# Patient Record
Sex: FEMALE | Race: WHITE | URBAN - METROPOLITAN AREA
[De-identification: names, ages, dates, MRNs, and addresses within clinical notes are randomized per-mention and may not be internally consistent; named-entity substitution may affect disease eponyms.]

---

## 2023-09-18 ENCOUNTER — TRANSCRIBE ORDERS (OUTPATIENT)
Dept: SLEEP CENTER | Age: 48
End: 2023-09-18

## 2023-09-18 DIAGNOSIS — G47.10 HYPERSOMNIA: Primary | ICD-10-CM

## 2023-09-18 DIAGNOSIS — G47.33 OBSTRUCTIVE SLEEP APNEA: ICD-10-CM

## 2023-11-13 ENCOUNTER — HOSPITAL ENCOUNTER (OUTPATIENT)
Dept: SLEEP CENTER | Age: 48
Discharge: HOME | End: 2023-11-13
Payer: MEDICARE

## 2023-11-13 DIAGNOSIS — G47.33 OBSTRUCTIVE SLEEP APNEA: ICD-10-CM

## 2023-11-13 DIAGNOSIS — G47.10 HYPERSOMNIA: ICD-10-CM

## 2023-11-13 PROCEDURE — G0398 HOME SLEEP TEST/TYPE 2 PORTA: HCPCS

## 2023-11-13 NOTE — PROGRESS NOTES
Dedra Vazquez  1975  arrived at [address] [place] [address] on 11/13/2023 for set up and instruction of home sleep study with the [facility] unit.  she was instructed on proper set-up and operation of HST unit. Written instructions with set up diagram given for reference and reinforcement of verbal instruction and demonstration. she was able to return demonstration appropriately. No home environment, vision, dexterity, comprehension concerns with this patient based on completed forms and patient interactions. Patient will return unit after 2 nights as instructed.     Electronically signed by Jarred Hoyt on 11/13/2023 at 4:04 PM

## 2024-06-24 ENCOUNTER — APPOINTMENT (OUTPATIENT)
Dept: CT IMAGING | Age: 49
End: 2024-06-24
Payer: MEDICARE

## 2024-06-24 ENCOUNTER — HOSPITAL ENCOUNTER (EMERGENCY)
Age: 49
Discharge: HOME | End: 2024-06-24
Attending: EMERGENCY MEDICINE
Payer: MEDICARE

## 2024-06-24 VITALS
WEIGHT: 230 LBS | HEIGHT: 62 IN | TEMPERATURE: 98.4 F | OXYGEN SATURATION: 93 % | BODY MASS INDEX: 42.33 KG/M2 | HEART RATE: 88 BPM | DIASTOLIC BLOOD PRESSURE: 83 MMHG | RESPIRATION RATE: 18 BRPM | SYSTOLIC BLOOD PRESSURE: 116 MMHG

## 2024-06-24 DIAGNOSIS — R10.9 FLANK PAIN: Primary | ICD-10-CM

## 2024-06-24 LAB
ALBUMIN SERPL-MCNC: 3.7 GM/DL (ref 3.4–5)
ALP BLD-CCNC: 141 IU/L (ref 40–129)
ALT SERPL-CCNC: 30 U/L (ref 10–40)
ANION GAP SERPL CALCULATED.3IONS-SCNC: 16 MMOL/L (ref 7–16)
AST SERPL-CCNC: 29 IU/L (ref 15–37)
BASOPHILS ABSOLUTE: 0.1 K/CU MM
BASOPHILS RELATIVE PERCENT: 0.5 % (ref 0–1)
BILIRUB SERPL-MCNC: 0.2 MG/DL (ref 0–1)
BILIRUBIN, URINE: NEGATIVE MG/DL
BLOOD, URINE: NEGATIVE
BUN SERPL-MCNC: 14 MG/DL (ref 6–23)
CALCIUM SERPL-MCNC: 10.6 MG/DL (ref 8.3–10.6)
CHLORIDE BLD-SCNC: 100 MMOL/L (ref 99–110)
CLARITY: CLEAR
CO2: 23 MMOL/L (ref 21–32)
COLOR: YELLOW
COMMENT UA: ABNORMAL
CREAT SERPL-MCNC: 0.9 MG/DL (ref 0.6–1.1)
DIFFERENTIAL TYPE: ABNORMAL
EOSINOPHILS ABSOLUTE: 0.2 K/CU MM
EOSINOPHILS RELATIVE PERCENT: 1.3 % (ref 0–3)
GFR, ESTIMATED: 80 ML/MIN/1.73M2
GLUCOSE SERPL-MCNC: 97 MG/DL (ref 70–99)
GLUCOSE URINE: >1000 MG/DL
HCT VFR BLD CALC: 40.6 % (ref 37–47)
HEMOGLOBIN: 12.7 GM/DL (ref 12.5–16)
IMMATURE NEUTROPHIL %: 0.5 % (ref 0–0.43)
KETONES, URINE: NEGATIVE MG/DL
LEUKOCYTE ESTERASE, URINE: NEGATIVE
LYMPHOCYTES ABSOLUTE: 4.9 K/CU MM
LYMPHOCYTES RELATIVE PERCENT: 27.3 % (ref 24–44)
MCH RBC QN AUTO: 29.3 PG (ref 27–31)
MCHC RBC AUTO-ENTMCNC: 31.3 % (ref 32–36)
MCV RBC AUTO: 93.5 FL (ref 78–100)
MONOCYTES ABSOLUTE: 1.1 K/CU MM
MONOCYTES RELATIVE PERCENT: 6.1 % (ref 0–4)
NEUTROPHILS ABSOLUTE: 11.5 K/CU MM
NEUTROPHILS RELATIVE PERCENT: 64.3 % (ref 36–66)
NITRITE URINE, QUANTITATIVE: NEGATIVE
NUCLEATED RBC %: 0 %
PDW BLD-RTO: 14.3 % (ref 11.7–14.9)
PH, URINE: 6 (ref 5–8)
PLATELET # BLD: 394 K/CU MM (ref 140–440)
PMV BLD AUTO: 11.8 FL (ref 7.5–11.1)
POTASSIUM SERPL-SCNC: 3.9 MMOL/L (ref 3.5–5.1)
PROTEIN UA: NEGATIVE MG/DL
RBC # BLD: 4.34 M/CU MM (ref 4.2–5.4)
SODIUM BLD-SCNC: 139 MMOL/L (ref 135–145)
SPECIFIC GRAVITY UA: 1.01 (ref 1–1.03)
TOTAL IMMATURE NEUTOROPHIL: 0.09 K/CU MM
TOTAL NUCLEATED RBC: 0 K/CU MM
TOTAL PROTEIN: 7.4 GM/DL (ref 6.4–8.2)
UROBILINOGEN, URINE: 0.2 MG/DL (ref 0.2–1)
WBC # BLD: 17.9 K/CU MM (ref 4–10.5)

## 2024-06-24 PROCEDURE — 74176 CT ABD & PELVIS W/O CONTRAST: CPT

## 2024-06-24 PROCEDURE — 81003 URINALYSIS AUTO W/O SCOPE: CPT

## 2024-06-24 PROCEDURE — 80053 COMPREHEN METABOLIC PANEL: CPT

## 2024-06-24 PROCEDURE — 85025 COMPLETE CBC W/AUTO DIFF WBC: CPT

## 2024-06-24 PROCEDURE — 99284 EMERGENCY DEPT VISIT MOD MDM: CPT

## 2024-06-24 PROCEDURE — 6370000000 HC RX 637 (ALT 250 FOR IP): Performed by: EMERGENCY MEDICINE

## 2024-06-24 RX ORDER — OXYCODONE HYDROCHLORIDE AND ACETAMINOPHEN 5; 325 MG/1; MG/1
1 TABLET ORAL
Status: COMPLETED | OUTPATIENT
Start: 2024-06-24 | End: 2024-06-24

## 2024-06-24 RX ORDER — TIZANIDINE 4 MG/1
4 TABLET ORAL EVERY 8 HOURS PRN
Qty: 21 TABLET | Refills: 0 | Status: SHIPPED | OUTPATIENT
Start: 2024-06-24 | End: 2024-07-01

## 2024-06-24 RX ORDER — METHYLPREDNISOLONE 4 MG/1
TABLET ORAL
Qty: 21 TABLET | Refills: 0 | Status: SHIPPED | OUTPATIENT
Start: 2024-06-24 | End: 2024-06-30

## 2024-06-24 RX ORDER — LIDOCAINE 4 G/G
1 PATCH TOPICAL DAILY
Qty: 30 PATCH | Refills: 0 | Status: SHIPPED | OUTPATIENT
Start: 2024-06-24 | End: 2024-07-24

## 2024-06-24 RX ADMIN — OXYCODONE HYDROCHLORIDE AND ACETAMINOPHEN 1 TABLET: 5; 325 TABLET ORAL at 17:22

## 2024-06-24 ASSESSMENT — PAIN - FUNCTIONAL ASSESSMENT
PAIN_FUNCTIONAL_ASSESSMENT: ACTIVITIES ARE NOT PREVENTED
PAIN_FUNCTIONAL_ASSESSMENT: ACTIVITIES ARE NOT PREVENTED

## 2024-06-24 ASSESSMENT — PAIN DESCRIPTION - LOCATION
LOCATION: FLANK

## 2024-06-24 ASSESSMENT — PAIN SCALES - GENERAL
PAINLEVEL_OUTOF10: 9

## 2024-06-24 ASSESSMENT — PAIN DESCRIPTION - ORIENTATION
ORIENTATION: LEFT
ORIENTATION: LEFT

## 2024-06-24 ASSESSMENT — PAIN DESCRIPTION - PAIN TYPE
TYPE: ACUTE PAIN
TYPE: ACUTE PAIN

## 2024-06-24 ASSESSMENT — PAIN DESCRIPTION - DESCRIPTORS
DESCRIPTORS: ACHING;SHOOTING
DESCRIPTORS: SHARP

## 2024-06-24 ASSESSMENT — PAIN DESCRIPTION - FREQUENCY
FREQUENCY: CONTINUOUS
FREQUENCY: CONTINUOUS

## 2024-06-24 ASSESSMENT — PAIN DESCRIPTION - ONSET
ONSET: ON-GOING
ONSET: ON-GOING

## 2024-06-24 NOTE — ED NOTES
Reports no change in pain after medication. Pt walking around lobby and eating/drinking without difficulty.

## 2024-06-24 NOTE — ED NOTES
Pt requested no discharge vitals. Paperwork gone over. Pt verbalized understanding. No distress noted at time of discharge.

## 2024-06-24 NOTE — ED PROVIDER NOTES
Triage Chief Complaint:    No chief complaint on file.    HPI   Shraddha Beebe is a 48 y.o. female that presents for evaluation of left flank pain.  She states that she was worried it was her kidney.  She has not noticed any numbness tingling or weakness.  No urinary symptoms.  No vaginal symptoms.  No change in bowel movements.  Denies any chest pain or shortness of breath cough or congestion.  No trauma.  No rashes.  She has a long history of back issues and was worried that might be causing some of this issue as well.    History from : Patient    Limitations to history : None    ROS:  10 systems reviewed and negative except as above.     No past medical history on file.  Past Surgical History:   Procedure Laterality Date    CT VISCERAL PERCUTANEOUS DRAIN  11/2/2015    CT VISCERAL PERCUTANEOUS DRAIN 11/2/2015     No family history on file.  Social History     Socioeconomic History    Marital status: Unknown     Spouse name: Not on file    Number of children: Not on file    Years of education: Not on file    Highest education level: Not on file   Occupational History    Not on file   Tobacco Use    Smoking status: Not on file    Smokeless tobacco: Not on file   Substance and Sexual Activity    Alcohol use: Not on file    Drug use: Not on file    Sexual activity: Not on file   Other Topics Concern    Not on file   Social History Narrative    Not on file     Social Determinants of Health     Financial Resource Strain: Not on file   Food Insecurity: Not on file   Transportation Needs: Not on file   Physical Activity: Not on file   Stress: Not on file   Social Connections: Not on file   Intimate Partner Violence: Not on file   Housing Stability: Not on file     No current facility-administered medications for this encounter.     Current Outpatient Medications   Medication Sig Dispense Refill    tiZANidine (ZANAFLEX) 4 MG tablet Take 1 tablet by mouth every 8 hours as needed (muscle spasm) 21 tablet 0    lidocaine

## 2024-08-15 ENCOUNTER — HOSPITAL ENCOUNTER (OUTPATIENT)
Age: 49
Setting detail: SPECIMEN
Discharge: HOME | End: 2024-08-15
Payer: MEDICARE

## 2024-08-15 ENCOUNTER — HOSPITAL ENCOUNTER (OUTPATIENT)
Dept: MRI IMAGING | Age: 49
Discharge: HOME | End: 2024-08-15
Payer: MEDICARE

## 2024-08-15 DIAGNOSIS — M47.897 OTHER SPONDYLOSIS, LUMBOSACRAL REGION: ICD-10-CM

## 2024-08-15 DIAGNOSIS — M48.07 SPINAL STENOSIS, LUMBOSACRAL REGION: ICD-10-CM

## 2024-08-15 DIAGNOSIS — M43.28 FUSION OF SPINE, SACRAL AND SACROCOCCYGEAL REGION: ICD-10-CM

## 2024-08-15 DIAGNOSIS — M53.3 SACROCOCCYGEAL DISORDERS, NOT ELSEWHERE CLASSIFIED: ICD-10-CM

## 2024-08-15 PROCEDURE — 87449 NOS EACH ORGANISM AG IA: CPT

## 2024-08-15 PROCEDURE — 72148 MRI LUMBAR SPINE W/O DYE: CPT

## 2024-08-15 PROCEDURE — 72195 MRI PELVIS W/O DYE: CPT

## 2024-08-15 PROCEDURE — 87324 CLOSTRIDIUM AG IA: CPT

## 2024-08-16 LAB
REASON FOR REJECTION: NORMAL
REJECTED TEST: NORMAL

## 2024-10-29 LAB
ALT SERPL-CCNC: 17 U/L (ref 0–60)
AST SERPL-CCNC: 16 U/L (ref 0–55)
BUN / CREAT RATIO: 11 (ref 7–25)
BUN BLDV-MCNC: 9 MG/DL (ref 3–29)
CREAT SERPL-MCNC: 0.8 MG/DL (ref 0.5–1.2)
HCT VFR BLD CALC: 39.8 % (ref 34–49)
HEMOGLOBIN: 12.5 G/DL (ref 11.2–15.7)
MCH RBC QN AUTO: 29.3 PG (ref 26–34)
MCHC RBC AUTO-ENTMCNC: 31.4 G/DL (ref 30.7–35.5)
MCV RBC AUTO: 93.2 FL (ref 80–100)
PDW BLD-RTO: 13.4 %
PLATELET # BLD: ABNORMAL K/UL (ref 140–400)
PMV BLD AUTO: ABNORMAL FL (ref 7.2–11.7)
RBC # BLD: 4.27 M/UL (ref 3.95–5.26)
SED RATE, AUTOMATED: 54 MM/HR (ref 0–20)
WBC # BLD: 23.9 K/UL (ref 3.5–10.9)

## 2024-10-30 ENCOUNTER — HOSPITAL ENCOUNTER (INPATIENT)
Age: 49
LOS: 2 days | Discharge: HOME | End: 2024-11-01
Attending: EMERGENCY MEDICINE | Admitting: INTERNAL MEDICINE
Payer: MEDICARE

## 2024-10-30 ENCOUNTER — APPOINTMENT (OUTPATIENT)
Dept: GENERAL RADIOLOGY | Age: 49
End: 2024-10-30
Payer: MEDICARE

## 2024-10-30 ENCOUNTER — APPOINTMENT (OUTPATIENT)
Dept: CT IMAGING | Age: 49
End: 2024-10-30
Payer: MEDICARE

## 2024-10-30 DIAGNOSIS — S92.422A DISPLACED FRACTURE OF DISTAL PHALANX OF LEFT GREAT TOE, INITIAL ENCOUNTER FOR CLOSED FRACTURE: ICD-10-CM

## 2024-10-30 DIAGNOSIS — R73.9 HYPERGLYCEMIA: ICD-10-CM

## 2024-10-30 DIAGNOSIS — D72.829 LEUKOCYTOSIS, UNSPECIFIED TYPE: ICD-10-CM

## 2024-10-30 DIAGNOSIS — E11.10 DIABETIC KETOACIDOSIS WITHOUT COMA ASSOCIATED WITH TYPE 2 DIABETES MELLITUS (HCC): Primary | ICD-10-CM

## 2024-10-30 DIAGNOSIS — R41.82 ALTERED MENTAL STATUS, UNSPECIFIED ALTERED MENTAL STATUS TYPE: ICD-10-CM

## 2024-10-30 PROBLEM — E10.10 DKA, TYPE 1, NOT AT GOAL (HCC): Status: ACTIVE | Noted: 2024-10-30

## 2024-10-30 LAB
ALBUMIN SERPL-MCNC: 3.4 G/DL (ref 3.4–5)
ALBUMIN/GLOB SERPL: 0.8 {RATIO} (ref 1.1–2.2)
ALP SERPL-CCNC: 100 U/L (ref 40–129)
ALT SERPL-CCNC: 12 U/L (ref 10–40)
AMPHET UR QL SCN: NEGATIVE
ANION GAP SERPL CALCULATED.3IONS-SCNC: 18 MMOL/L (ref 9–17)
APAP SERPL-MCNC: <5 UG/ML (ref 10–30)
AST SERPL-CCNC: 26 U/L (ref 15–37)
B PARAP IS1001 DNA NPH QL NAA+NON-PROBE: NOT DETECTED
B PERT DNA SPEC QL NAA+PROBE: NOT DETECTED
B-OH-BUTYR SERPL-MCNC: 2.08 MMOL/L (ref 0–0.27)
BARBITURATES UR QL SCN: NEGATIVE
BASOPHILS # BLD: 0 K/UL
BASOPHILS NFR BLD: 0 % (ref 0–1)
BENZODIAZ UR QL: NEGATIVE
BILIRUB SERPL-MCNC: 0.3 MG/DL (ref 0–1)
BILIRUB UR QL STRIP: NEGATIVE
BNP SERPL-MCNC: 107 PG/ML (ref 0–125)
BUN SERPL-MCNC: 18 MG/DL (ref 7–20)
C PNEUM DNA NPH QL NAA+NON-PROBE: NOT DETECTED
CALCIUM SERPL-MCNC: 9.4 MG/DL (ref 8.3–10.6)
CANNABINOIDS UR QL SCN: POSITIVE
CHLORIDE SERPL-SCNC: 96 MMOL/L (ref 99–110)
CLARITY UR: CLEAR
CO2 SERPL-SCNC: 21 MMOL/L (ref 21–32)
COCAINE UR QL SCN: NEGATIVE
COLOR UR: YELLOW
COMMENT: ABNORMAL
CREAT SERPL-MCNC: 0.8 MG/DL (ref 0.6–1.1)
EOSINOPHIL # BLD: 0.23 K/UL
EOSINOPHILS RELATIVE PERCENT: 1 % (ref 0–3)
ERYTHROCYTE [DISTWIDTH] IN BLOOD BY AUTOMATED COUNT: 14.9 % (ref 11.7–14.9)
ETHANOLAMINE SERPL-MCNC: <10 MG/DL (ref 0–0.08)
FENTANYL UR QL: NEGATIVE
FLUAV RNA NPH QL NAA+NON-PROBE: NOT DETECTED
FLUBV RNA NPH QL NAA+NON-PROBE: NOT DETECTED
GFR, ESTIMATED: 73 ML/MIN/1.73M2
GLUCOSE BLD-MCNC: 453 MG/DL (ref 74–99)
GLUCOSE SERPL-MCNC: 488 MG/DL (ref 74–99)
GLUCOSE UR STRIP-MCNC: >=1000 MG/DL
HADV DNA NPH QL NAA+NON-PROBE: NOT DETECTED
HCOV 229E RNA NPH QL NAA+NON-PROBE: NOT DETECTED
HCOV HKU1 RNA NPH QL NAA+NON-PROBE: NOT DETECTED
HCOV NL63 RNA NPH QL NAA+NON-PROBE: NOT DETECTED
HCOV OC43 RNA NPH QL NAA+NON-PROBE: NOT DETECTED
HCT VFR BLD AUTO: 38.3 % (ref 37–47)
HGB BLD-MCNC: 12.2 G/DL (ref 12.5–16)
HGB UR QL STRIP.AUTO: NEGATIVE
HMPV RNA NPH QL NAA+NON-PROBE: NOT DETECTED
HPIV1 RNA NPH QL NAA+NON-PROBE: NOT DETECTED
HPIV2 RNA NPH QL NAA+NON-PROBE: NOT DETECTED
HPIV3 RNA NPH QL NAA+NON-PROBE: NOT DETECTED
HPIV4 RNA NPH QL NAA+NON-PROBE: NOT DETECTED
KETONES UR STRIP-MCNC: 15 MG/DL
LACTATE BLDV-SCNC: 1.3 MMOL/L (ref 0.4–2)
LEUKOCYTE ESTERASE UR QL STRIP: NEGATIVE
LIPASE SERPL-CCNC: 58 U/L (ref 13–60)
LYMPHOCYTES NFR BLD: 3.26 K/UL
LYMPHOCYTES RELATIVE PERCENT: 14 % (ref 24–44)
M PNEUMO DNA NPH QL NAA+NON-PROBE: NOT DETECTED
MCH RBC QN AUTO: 28.8 PG (ref 27–31)
MCHC RBC AUTO-ENTMCNC: 31.9 G/DL (ref 32–36)
MCV RBC AUTO: 90.5 FL (ref 78–100)
MONOCYTES NFR BLD: 0.7 K/UL
MONOCYTES NFR BLD: 3 % (ref 0–4)
NEUTROPHILS NFR BLD: 82 % (ref 36–66)
NEUTS SEG NFR BLD: 19.11 K/UL
NITRITE UR QL STRIP: NEGATIVE
OPIATES UR QL SCN: NEGATIVE
OXYCODONE UR QL SCN: NEGATIVE
PH UR STRIP: 6 [PH] (ref 5–8)
PLATELET # BLD AUTO: 366 K/UL (ref 140–440)
PLATELET ESTIMATE: NORMAL
PMV BLD AUTO: 12.2 FL (ref 7.5–11.1)
POTASSIUM SERPL-SCNC: 4.2 MMOL/L (ref 3.5–5.1)
PROT SERPL-MCNC: 7.5 G/DL (ref 6.4–8.2)
PROT UR STRIP-MCNC: NEGATIVE MG/DL
RBC # BLD AUTO: 4.23 M/UL (ref 4.2–5.4)
RBC # BLD: NORMAL 10*6/UL
RSV RNA NPH QL NAA+NON-PROBE: NOT DETECTED
RV+EV RNA NPH QL NAA+NON-PROBE: NOT DETECTED
SALICYLATES SERPL-MCNC: <0.5 MG/DL (ref 15–30)
SARS-COV-2 RNA NPH QL NAA+NON-PROBE: NOT DETECTED
SODIUM SERPL-SCNC: 135 MMOL/L (ref 136–145)
SP GR UR STRIP: <1.005 (ref 1–1.03)
SPECIMEN DESCRIPTION: NORMAL
TEST INFORMATION: ABNORMAL
TROPONIN I SERPL HS-MCNC: 12 NG/L (ref 0–14)
TSH SERPL DL<=0.05 MIU/L-ACNC: 0.69 UIU/ML (ref 0.27–4.2)
UROBILINOGEN UR STRIP-ACNC: 0.2 EU/DL (ref 0–1)
WBC # BLD: NORMAL 10*3/UL
WBC OTHER # BLD: 23.3 K/UL (ref 4–10.5)

## 2024-10-30 PROCEDURE — 36415 COLL VENOUS BLD VENIPUNCTURE: CPT

## 2024-10-30 PROCEDURE — 84484 ASSAY OF TROPONIN QUANT: CPT

## 2024-10-30 PROCEDURE — 82962 GLUCOSE BLOOD TEST: CPT

## 2024-10-30 PROCEDURE — 83690 ASSAY OF LIPASE: CPT

## 2024-10-30 PROCEDURE — 2000000000 HC ICU R&B

## 2024-10-30 PROCEDURE — 87040 BLOOD CULTURE FOR BACTERIA: CPT

## 2024-10-30 PROCEDURE — 73562 X-RAY EXAM OF KNEE 3: CPT

## 2024-10-30 PROCEDURE — 99285 EMERGENCY DEPT VISIT HI MDM: CPT

## 2024-10-30 PROCEDURE — 2580000003 HC RX 258: Performed by: EMERGENCY MEDICINE

## 2024-10-30 PROCEDURE — 80053 COMPREHEN METABOLIC PANEL: CPT

## 2024-10-30 PROCEDURE — 74176 CT ABD & PELVIS W/O CONTRAST: CPT

## 2024-10-30 PROCEDURE — 80179 DRUG ASSAY SALICYLATE: CPT

## 2024-10-30 PROCEDURE — 71045 X-RAY EXAM CHEST 1 VIEW: CPT

## 2024-10-30 PROCEDURE — 80307 DRUG TEST PRSMV CHEM ANLYZR: CPT

## 2024-10-30 PROCEDURE — 2580000003 HC RX 258: Performed by: INTERNAL MEDICINE

## 2024-10-30 PROCEDURE — G0480 DRUG TEST DEF 1-7 CLASSES: HCPCS

## 2024-10-30 PROCEDURE — 83880 ASSAY OF NATRIURETIC PEPTIDE: CPT

## 2024-10-30 PROCEDURE — 81003 URINALYSIS AUTO W/O SCOPE: CPT

## 2024-10-30 PROCEDURE — 96361 HYDRATE IV INFUSION ADD-ON: CPT

## 2024-10-30 PROCEDURE — 82805 BLOOD GASES W/O2 SATURATION: CPT

## 2024-10-30 PROCEDURE — 85025 COMPLETE CBC W/AUTO DIFF WBC: CPT

## 2024-10-30 PROCEDURE — 84443 ASSAY THYROID STIM HORMONE: CPT

## 2024-10-30 PROCEDURE — 80143 DRUG ASSAY ACETAMINOPHEN: CPT

## 2024-10-30 PROCEDURE — 6360000002 HC RX W HCPCS: Performed by: EMERGENCY MEDICINE

## 2024-10-30 PROCEDURE — 83605 ASSAY OF LACTIC ACID: CPT

## 2024-10-30 PROCEDURE — 87086 URINE CULTURE/COLONY COUNT: CPT

## 2024-10-30 PROCEDURE — 83036 HEMOGLOBIN GLYCOSYLATED A1C: CPT

## 2024-10-30 PROCEDURE — 93005 ELECTROCARDIOGRAM TRACING: CPT | Performed by: EMERGENCY MEDICINE

## 2024-10-30 PROCEDURE — 96360 HYDRATION IV INFUSION INIT: CPT

## 2024-10-30 PROCEDURE — 72125 CT NECK SPINE W/O DYE: CPT

## 2024-10-30 PROCEDURE — 82010 KETONE BODYS QUAN: CPT

## 2024-10-30 PROCEDURE — 70450 CT HEAD/BRAIN W/O DYE: CPT

## 2024-10-30 PROCEDURE — 0202U NFCT DS 22 TRGT SARS-COV-2: CPT

## 2024-10-30 RX ORDER — SODIUM CHLORIDE 0.9 % (FLUSH) 0.9 %
5-40 SYRINGE (ML) INJECTION PRN
Status: DISCONTINUED | OUTPATIENT
Start: 2024-10-30 | End: 2024-11-01 | Stop reason: HOSPADM

## 2024-10-30 RX ORDER — 0.9 % SODIUM CHLORIDE 0.9 %
15 INTRAVENOUS SOLUTION INTRAVENOUS ONCE
Status: DISCONTINUED | OUTPATIENT
Start: 2024-10-30 | End: 2024-10-31

## 2024-10-30 RX ORDER — DEXTROSE MONOHYDRATE AND SODIUM CHLORIDE 5; .45 G/100ML; G/100ML
INJECTION, SOLUTION INTRAVENOUS CONTINUOUS PRN
Status: DISCONTINUED | OUTPATIENT
Start: 2024-10-30 | End: 2024-10-31

## 2024-10-30 RX ORDER — SODIUM CHLORIDE 450 MG/100ML
INJECTION, SOLUTION INTRAVENOUS CONTINUOUS
Status: DISCONTINUED | OUTPATIENT
Start: 2024-10-30 | End: 2024-10-31

## 2024-10-30 RX ORDER — ACETAMINOPHEN 325 MG/1
650 TABLET ORAL EVERY 6 HOURS PRN
Status: DISCONTINUED | OUTPATIENT
Start: 2024-10-30 | End: 2024-11-01 | Stop reason: HOSPADM

## 2024-10-30 RX ORDER — 0.9 % SODIUM CHLORIDE 0.9 %
1000 INTRAVENOUS SOLUTION INTRAVENOUS ONCE
Status: DISCONTINUED | OUTPATIENT
Start: 2024-10-30 | End: 2024-10-31

## 2024-10-30 RX ORDER — ACETAMINOPHEN 650 MG/1
650 SUPPOSITORY RECTAL EVERY 6 HOURS PRN
Status: DISCONTINUED | OUTPATIENT
Start: 2024-10-30 | End: 2024-11-01 | Stop reason: HOSPADM

## 2024-10-30 RX ORDER — SODIUM CHLORIDE 0.9 % (FLUSH) 0.9 %
5-40 SYRINGE (ML) INJECTION EVERY 12 HOURS SCHEDULED
Status: DISCONTINUED | OUTPATIENT
Start: 2024-10-30 | End: 2024-11-01 | Stop reason: HOSPADM

## 2024-10-30 RX ORDER — SODIUM CHLORIDE 9 MG/ML
INJECTION, SOLUTION INTRAVENOUS PRN
Status: DISCONTINUED | OUTPATIENT
Start: 2024-10-30 | End: 2024-11-01 | Stop reason: HOSPADM

## 2024-10-30 RX ORDER — ENOXAPARIN SODIUM 100 MG/ML
30 INJECTION SUBCUTANEOUS 2 TIMES DAILY
Status: DISCONTINUED | OUTPATIENT
Start: 2024-10-30 | End: 2024-11-01 | Stop reason: HOSPADM

## 2024-10-30 RX ORDER — 0.9 % SODIUM CHLORIDE 0.9 %
1000 INTRAVENOUS SOLUTION INTRAVENOUS ONCE
Status: COMPLETED | OUTPATIENT
Start: 2024-10-30 | End: 2024-10-30

## 2024-10-30 RX ORDER — POLYETHYLENE GLYCOL 3350 17 G/17G
17 POWDER, FOR SOLUTION ORAL DAILY PRN
Status: DISCONTINUED | OUTPATIENT
Start: 2024-10-30 | End: 2024-11-01 | Stop reason: HOSPADM

## 2024-10-30 RX ORDER — POTASSIUM CHLORIDE 7.45 MG/ML
10 INJECTION INTRAVENOUS PRN
Status: DISCONTINUED | OUTPATIENT
Start: 2024-10-30 | End: 2024-11-01 | Stop reason: HOSPADM

## 2024-10-30 RX ORDER — ONDANSETRON 2 MG/ML
4 INJECTION INTRAMUSCULAR; INTRAVENOUS EVERY 30 MIN PRN
Status: DISCONTINUED | OUTPATIENT
Start: 2024-10-30 | End: 2024-11-01 | Stop reason: HOSPADM

## 2024-10-30 RX ORDER — POTASSIUM CHLORIDE 7.45 MG/ML
10 INJECTION INTRAVENOUS PRN
Status: DISCONTINUED | OUTPATIENT
Start: 2024-10-30 | End: 2024-10-30

## 2024-10-30 RX ORDER — MAGNESIUM SULFATE IN WATER 40 MG/ML
2000 INJECTION, SOLUTION INTRAVENOUS PRN
Status: DISCONTINUED | OUTPATIENT
Start: 2024-10-30 | End: 2024-11-01 | Stop reason: HOSPADM

## 2024-10-30 RX ADMIN — CEFTRIAXONE SODIUM 2000 MG: 2 INJECTION, POWDER, FOR SOLUTION INTRAMUSCULAR; INTRAVENOUS at 22:40

## 2024-10-30 RX ADMIN — SODIUM CHLORIDE 1000 ML: 9 INJECTION, SOLUTION INTRAVENOUS at 17:05

## 2024-10-30 RX ADMIN — SODIUM CHLORIDE: 4.5 INJECTION, SOLUTION INTRAVENOUS at 22:59

## 2024-10-30 ASSESSMENT — ENCOUNTER SYMPTOMS
NAUSEA: 1
VOMITING: 1

## 2024-10-30 ASSESSMENT — PAIN - FUNCTIONAL ASSESSMENT: PAIN_FUNCTIONAL_ASSESSMENT: NONE - DENIES PAIN

## 2024-10-30 NOTE — ED PROVIDER NOTES
[facility]      TRIAGE CHIEF COMPLAINT:   Hyperglycemia (Read HI a home for son)      [place]:  Shraddha Beebe is a 49 y.o. female that presents by EMS from home with complaint of high blood sugar.  Apparently patient is noncompliant with her medications has not been taking her metformin son checked her blood sugar at home and read high brought in for evaluation.  They found her today confused which is new had some vomit may have fallen unclear patient is confused upon arrival she states her arm hurts but she states it hurts after the IVs.  And she cannot tell me the year cannot tell me the president she is awake and talking she is hypertensive, she does have abrasions to her right knee, and again is confused on arrival.  Workup initiated.  Family denies any drug use.    REVIEW OF SYSTEMS:    Review of Systems   Gastrointestinal:  Positive for nausea and vomiting.   Skin: Negative.    Psychiatric/Behavioral:  Positive for confusion.        No past medical history on file.  Past Surgical History:   Procedure Laterality Date    CT VISCERAL PERCUTANEOUS DRAIN  11/2/2015    CT VISCERAL PERCUTANEOUS DRAIN 11/2/2015     No family history on file.  Social History     Socioeconomic History    Marital status: Unknown     Spouse name: Not on file    Number of children: Not on file    Years of education: Not on file    Highest education level: Not on file   Occupational History    Not on file   Tobacco Use    Smoking status: Every Day     Current packs/day: 0.50     Types: Cigarettes    Smokeless tobacco: Never   Substance and Sexual Activity    Alcohol use: Never    Drug use: Never    Sexual activity: Not on file   Other Topics Concern    Not on file   Social History Narrative    Not on file     Social Determinants of Health     Financial Resource Strain: Medium Risk (4/5/2021)    Received from [facility], [facility]    Overall  mg Oral BID Aubrey Butler DO   300 mg at 10/31/24 0823    metoprolol (LOPRESSOR) injection 5 mg  5 mg IntraVENous Q6H PRN Aubrey Butler DO        insulin lispro (HUMALOG,ADMELOG) injection vial 10 Units  10 Units SubCUTAneous TID WC ARMINDA Urias MD   10 Units at 10/31/24 1809    insulin glargine (LANTUS) injection vial 25 Units  25 Units SubCUTAneous Nightly ARMINDA Urias MD        insulin lispro (HUMALOG,ADMELOG) injection vial 0-8 Units  0-8 Units SubCUTAneous 4x Daily AC & HS ARMINDA Urias MD   2 Units at 10/31/24 1808    albuterol sulfate HFA (PROVENTIL;VENTOLIN;PROAIR) 108 (90 Base) MCG/ACT inhaler 2 puff  2 puff Inhalation Q6H PRN Ophelia Saldaña PA-C        amLODIPine (NORVASC) tablet 2.5 mg  2.5 mg Oral Daily Ophelia Saldaña PA-C   2.5 mg at 10/31/24 1327    baclofen (LIORESAL) tablet 10 mg  10 mg Oral TID Ophelia Saldaña PA-C   10 mg at 10/31/24 1327    busPIRone (BUSPAR) tablet 10 mg  10 mg Oral TID Ophelia Saldaña PA-C   10 mg at 10/31/24 1327    dicyclomine (BENTYL) tablet 10 mg  10 mg Oral TID  Ophelia Saldaña PA-C   10 mg at 10/31/24 1809    DULoxetine (CYMBALTA) extended release capsule 60 mg  60 mg Oral BID Ophelia Saldaña PA-C   60 mg at 10/31/24 1333    ferrous sulfate (IRON 325) tablet 325 mg  325 mg Oral BID  Ophelia Saldaña PA-C   325 mg at 10/31/24 1809    labetalol (NORMODYNE) tablet 100 mg  100 mg Oral BID Ophelia Saldaña PA-C   100 mg at 10/31/24 1333    pravastatin (PRAVACHOL) tablet 40 mg  40 mg Oral Daily Ophelia Saldaña PA-C   40 mg at 10/31/24 1333    QUEtiapine (SEROQUEL) tablet 200 mg  200 mg Oral TID Ophelia Saldaña PA-C   200 mg at 10/31/24 1327    rOPINIRole (REQUIP) tablet 1 mg  1 mg Oral BID Ophelia Saldaña PA-C   1 mg at 10/31/24 1327    zolpidem (AMBIEN) tablet 10 mg  10 mg Oral Nightly Ophelia Saldaña PA-C        potassium bicarb-citric acid (EFFER-K) effervescent tablet 40 mEq  40 mEq Oral BID Loraine Sterling MD

## 2024-10-30 NOTE — ED TRIAGE NOTES
Pt arrived to ED via EMS for hyperglycemia. Pt has been non compliant on taking medications. Per EMS a few pill bottles were full of medications. Pt is a/ox1. Pt explained she fell but is unsure when . Pt explained she is not on blood thinners. Pt denies any pain at this time.

## 2024-10-30 NOTE — ED NOTES
ED TO INPATIENT SBAR HANDOFF    Patient Name: Shraddha Beebe   :  1975  49 y.o.   Preferred Name    Family/Caregiver Present yes   Restraints no   C-SSRS: Risk of Suicide: No Risk  Sitter no   Sepsis Risk Score        Situation  Chief Complaint   Patient presents with    Hyperglycemia     Read HI a home for son     Brief Description of Patient's Condition: Pt arrived to ED via EMS for hyperglycemia. Pt Is from home and is non compliant with medication. Pt is currently a/ox.  Mental Status: disoriented and alert  Arrived from: home    Imaging:   CT HEAD WO CONTRAST    (Results Pending)   CT CERVICAL SPINE WO CONTRAST    (Results Pending)   XR CHEST PORTABLE    (Results Pending)   CT ABDOMEN PELVIS WO CONTRAST Additional Contrast? None    (Results Pending)   XR KNEE RIGHT (3 VIEWS)    (Results Pending)     Abnormal labs:   Abnormal Labs Reviewed   CBC WITH AUTO DIFFERENTIAL - Abnormal; Notable for the following components:       Result Value    WBC 23.3 (*)     Hemoglobin 12.2 (*)     MCHC 31.9 (*)     MPV 12.2 (*)     Neutrophils % 82 (*)     Lymphocytes % 14 (*)     All other components within normal limits   POCT GLUCOSE - Abnormal; Notable for the following components:    POC Glucose 453 (*)     All other components within normal limits        Background  History: No past medical history on file.    Assessment    Vitals: MEWS Score: 1  Level of Consciousness: Alert (0)   Vitals:    10/30/24 1649 10/30/24 1702 10/30/24 1732   BP: (!) 162/109 (!) 183/160 (!) 162/115   Pulse: 76 96 83   Resp: 15 14 15   Temp: 97.8 °F (36.6 °C)     TempSrc: Oral     SpO2: 100% 100% 97%   Weight: 104.3 kg (230 lb)     Height: 1.575 m (5' 2\")         PO Status:     O2 Flow Rate:        Cardiac Rhythm:     Last documented pain medication administered:     NIH Score: NIH       Active LDA's:   Peripheral IV 10/30/24 Right;Anterior Hand (Active)       Pertinent or High Risk Medications/Drips: no   If Yes, please provide

## 2024-10-31 LAB
ANION GAP SERPL CALCULATED.3IONS-SCNC: 12 MMOL/L (ref 9–17)
ANION GAP SERPL CALCULATED.3IONS-SCNC: 13 MMOL/L (ref 9–17)
BUN SERPL-MCNC: 13 MG/DL (ref 7–20)
BUN SERPL-MCNC: 13 MG/DL (ref 7–20)
CALCIUM SERPL-MCNC: 9 MG/DL (ref 8.3–10.6)
CALCIUM SERPL-MCNC: 9.3 MG/DL (ref 8.3–10.6)
CHLORIDE SERPL-SCNC: 99 MMOL/L (ref 99–110)
CHLORIDE SERPL-SCNC: 99 MMOL/L (ref 99–110)
CO2 SERPL-SCNC: 27 MMOL/L (ref 21–32)
CO2 SERPL-SCNC: 29 MMOL/L (ref 21–32)
CREAT SERPL-MCNC: 0.6 MG/DL (ref 0.6–1.1)
CREAT SERPL-MCNC: 0.7 MG/DL (ref 0.6–1.1)
EKG ATRIAL RATE: 70 BPM
EKG DIAGNOSIS: NORMAL
EKG P AXIS: 61 DEGREES
EKG P-R INTERVAL: 158 MS
EKG Q-T INTERVAL: 416 MS
EKG QRS DURATION: 84 MS
EKG QTC CALCULATION (BAZETT): 449 MS
EKG R AXIS: 24 DEGREES
EKG T AXIS: 75 DEGREES
EKG VENTRICULAR RATE: 70 BPM
EST. AVERAGE GLUCOSE BLD GHB EST-MCNC: 225 MG/DL
GFR, ESTIMATED: >90 ML/MIN/1.73M2
GFR, ESTIMATED: >90 ML/MIN/1.73M2
GLUCOSE BLD-MCNC: 138 MG/DL (ref 74–99)
GLUCOSE BLD-MCNC: 144 MG/DL (ref 74–99)
GLUCOSE BLD-MCNC: 164 MG/DL (ref 74–99)
GLUCOSE BLD-MCNC: 179 MG/DL (ref 74–99)
GLUCOSE BLD-MCNC: 183 MG/DL (ref 74–99)
GLUCOSE BLD-MCNC: 188 MG/DL (ref 74–99)
GLUCOSE BLD-MCNC: 197 MG/DL (ref 74–99)
GLUCOSE BLD-MCNC: 214 MG/DL (ref 74–99)
GLUCOSE BLD-MCNC: 218 MG/DL (ref 74–99)
GLUCOSE BLD-MCNC: 240 MG/DL (ref 74–99)
GLUCOSE BLD-MCNC: 244 MG/DL (ref 74–99)
GLUCOSE BLD-MCNC: 258 MG/DL (ref 74–99)
GLUCOSE BLD-MCNC: 281 MG/DL (ref 74–99)
GLUCOSE BLD-MCNC: 316 MG/DL (ref 74–99)
GLUCOSE BLD-MCNC: 326 MG/DL (ref 74–99)
GLUCOSE SERPL-MCNC: 199 MG/DL (ref 74–99)
GLUCOSE SERPL-MCNC: 229 MG/DL (ref 74–99)
HBA1C MFR BLD: 9.5 % (ref 4.2–6.3)
MAGNESIUM SERPL-MCNC: 1.1 MG/DL (ref 1.8–2.4)
MAGNESIUM SERPL-MCNC: 1.9 MG/DL (ref 1.8–2.4)
MICROORGANISM SPEC CULT: NORMAL
PHOSPHATE SERPL-MCNC: 2.5 MG/DL (ref 2.5–4.9)
PHOSPHATE SERPL-MCNC: 3.1 MG/DL (ref 2.5–4.9)
POTASSIUM SERPL-SCNC: 3.3 MMOL/L (ref 3.5–5.1)
POTASSIUM SERPL-SCNC: 3.3 MMOL/L (ref 3.5–5.1)
SERVICE CMNT-IMP: NORMAL
SODIUM SERPL-SCNC: 138 MMOL/L (ref 136–145)
SODIUM SERPL-SCNC: 140 MMOL/L (ref 136–145)
SPECIMEN DESCRIPTION: NORMAL

## 2024-10-31 PROCEDURE — 6370000000 HC RX 637 (ALT 250 FOR IP): Performed by: PHYSICIAN ASSISTANT

## 2024-10-31 PROCEDURE — 2000000000 HC ICU R&B

## 2024-10-31 PROCEDURE — 84100 ASSAY OF PHOSPHORUS: CPT

## 2024-10-31 PROCEDURE — 6370000000 HC RX 637 (ALT 250 FOR IP): Performed by: STUDENT IN AN ORGANIZED HEALTH CARE EDUCATION/TRAINING PROGRAM

## 2024-10-31 PROCEDURE — 6370000000 HC RX 637 (ALT 250 FOR IP): Performed by: INTERNAL MEDICINE

## 2024-10-31 PROCEDURE — 6360000002 HC RX W HCPCS: Performed by: INTERNAL MEDICINE

## 2024-10-31 PROCEDURE — 93010 ELECTROCARDIOGRAM REPORT: CPT | Performed by: INTERNAL MEDICINE

## 2024-10-31 PROCEDURE — 94761 N-INVAS EAR/PLS OXIMETRY MLT: CPT

## 2024-10-31 PROCEDURE — 6360000002 HC RX W HCPCS: Performed by: EMERGENCY MEDICINE

## 2024-10-31 PROCEDURE — 80048 BASIC METABOLIC PNL TOTAL CA: CPT

## 2024-10-31 PROCEDURE — 82962 GLUCOSE BLOOD TEST: CPT

## 2024-10-31 PROCEDURE — 2580000003 HC RX 258: Performed by: INTERNAL MEDICINE

## 2024-10-31 PROCEDURE — 2700000000 HC OXYGEN THERAPY PER DAY

## 2024-10-31 PROCEDURE — 83735 ASSAY OF MAGNESIUM: CPT

## 2024-10-31 RX ORDER — INSULIN GLARGINE 100 [IU]/ML
25 INJECTION, SOLUTION SUBCUTANEOUS NIGHTLY
Status: DISCONTINUED | OUTPATIENT
Start: 2024-10-31 | End: 2024-11-01

## 2024-10-31 RX ORDER — BACLOFEN 10 MG/1
10 TABLET ORAL 3 TIMES DAILY
COMMUNITY

## 2024-10-31 RX ORDER — PRAVASTATIN SODIUM 40 MG
40 TABLET ORAL DAILY
Status: DISCONTINUED | OUTPATIENT
Start: 2024-10-31 | End: 2024-11-01 | Stop reason: HOSPADM

## 2024-10-31 RX ORDER — GABAPENTIN 600 MG/1
600 TABLET ORAL 2 TIMES DAILY
COMMUNITY

## 2024-10-31 RX ORDER — DULOXETIN HYDROCHLORIDE 30 MG/1
60 CAPSULE, DELAYED RELEASE ORAL 2 TIMES DAILY
Status: DISCONTINUED | OUTPATIENT
Start: 2024-10-31 | End: 2024-11-01 | Stop reason: HOSPADM

## 2024-10-31 RX ORDER — ROPINIROLE 1 MG/1
1 TABLET, FILM COATED ORAL DAILY
COMMUNITY

## 2024-10-31 RX ORDER — POTASSIUM CHLORIDE 1500 MG/1
40 TABLET, EXTENDED RELEASE ORAL ONCE
Status: COMPLETED | OUTPATIENT
Start: 2024-10-31 | End: 2024-10-31

## 2024-10-31 RX ORDER — ROPINIROLE 1 MG/1
3 TABLET, FILM COATED ORAL NIGHTLY
COMMUNITY

## 2024-10-31 RX ORDER — DICYCLOMINE HYDROCHLORIDE 10 MG/1
10 CAPSULE ORAL 3 TIMES DAILY
COMMUNITY

## 2024-10-31 RX ORDER — ZOLPIDEM TARTRATE 10 MG/1
10 TABLET ORAL NIGHTLY
COMMUNITY

## 2024-10-31 RX ORDER — POLYETHYLENE GLYCOL 3350 17 G/17G
17 POWDER, FOR SOLUTION ORAL DAILY
COMMUNITY

## 2024-10-31 RX ORDER — BUSPIRONE HYDROCHLORIDE 5 MG/1
10 TABLET ORAL 3 TIMES DAILY
Status: DISCONTINUED | OUTPATIENT
Start: 2024-10-31 | End: 2024-11-01 | Stop reason: HOSPADM

## 2024-10-31 RX ORDER — PRAVASTATIN SODIUM 40 MG
40 TABLET ORAL DAILY
COMMUNITY

## 2024-10-31 RX ORDER — LABETALOL 100 MG/1
100 TABLET, FILM COATED ORAL 2 TIMES DAILY
COMMUNITY

## 2024-10-31 RX ORDER — DULOXETIN HYDROCHLORIDE 60 MG/1
60 CAPSULE, DELAYED RELEASE ORAL 2 TIMES DAILY
COMMUNITY

## 2024-10-31 RX ORDER — QUETIAPINE FUMARATE 200 MG/1
200 TABLET, FILM COATED ORAL 3 TIMES DAILY
Status: DISCONTINUED | OUTPATIENT
Start: 2024-10-31 | End: 2024-11-01 | Stop reason: HOSPADM

## 2024-10-31 RX ORDER — METOPROLOL TARTRATE 1 MG/ML
5 INJECTION, SOLUTION INTRAVENOUS EVERY 6 HOURS PRN
Status: DISCONTINUED | OUTPATIENT
Start: 2024-10-31 | End: 2024-11-01 | Stop reason: HOSPADM

## 2024-10-31 RX ORDER — INSULIN LISPRO 100 [IU]/ML
0-8 INJECTION, SOLUTION INTRAVENOUS; SUBCUTANEOUS
Status: DISCONTINUED | OUTPATIENT
Start: 2024-10-31 | End: 2024-11-01 | Stop reason: HOSPADM

## 2024-10-31 RX ORDER — INSULIN GLARGINE 100 [IU]/ML
40 INJECTION, SOLUTION SUBCUTANEOUS NIGHTLY
Status: ON HOLD | COMMUNITY
End: 2024-11-01

## 2024-10-31 RX ORDER — FERROUS SULFATE 325(65) MG
325 TABLET ORAL 2 TIMES DAILY WITH MEALS
Status: DISCONTINUED | OUTPATIENT
Start: 2024-10-31 | End: 2024-11-01 | Stop reason: HOSPADM

## 2024-10-31 RX ORDER — AMLODIPINE BESYLATE 2.5 MG/1
2.5 TABLET ORAL DAILY
Status: DISCONTINUED | OUTPATIENT
Start: 2024-10-31 | End: 2024-11-01 | Stop reason: HOSPADM

## 2024-10-31 RX ORDER — POTASSIUM CHLORIDE 750 MG/1
10 TABLET, EXTENDED RELEASE ORAL 2 TIMES DAILY
COMMUNITY

## 2024-10-31 RX ORDER — ALBUTEROL SULFATE 90 UG/1
2 INHALANT RESPIRATORY (INHALATION) EVERY 6 HOURS PRN
Status: DISCONTINUED | OUTPATIENT
Start: 2024-10-31 | End: 2024-11-01 | Stop reason: HOSPADM

## 2024-10-31 RX ORDER — BACLOFEN 10 MG/1
10 TABLET ORAL 3 TIMES DAILY
Status: DISCONTINUED | OUTPATIENT
Start: 2024-10-31 | End: 2024-11-01 | Stop reason: HOSPADM

## 2024-10-31 RX ORDER — BUSPIRONE HYDROCHLORIDE 10 MG/1
10 TABLET ORAL 3 TIMES DAILY
COMMUNITY

## 2024-10-31 RX ORDER — AMLODIPINE BESYLATE 5 MG/1
2.5 TABLET ORAL DAILY
COMMUNITY

## 2024-10-31 RX ORDER — ALBUTEROL SULFATE 90 UG/1
2 INHALANT RESPIRATORY (INHALATION) EVERY 6 HOURS PRN
COMMUNITY

## 2024-10-31 RX ORDER — ZOLPIDEM TARTRATE 5 MG/1
10 TABLET ORAL NIGHTLY
Status: DISCONTINUED | OUTPATIENT
Start: 2024-10-31 | End: 2024-11-01 | Stop reason: HOSPADM

## 2024-10-31 RX ORDER — QUETIAPINE FUMARATE 100 MG/1
200 TABLET, FILM COATED ORAL 3 TIMES DAILY
COMMUNITY

## 2024-10-31 RX ORDER — DICYCLOMINE HCL 20 MG
10 TABLET ORAL
Status: DISCONTINUED | OUTPATIENT
Start: 2024-10-31 | End: 2024-11-01 | Stop reason: HOSPADM

## 2024-10-31 RX ORDER — FAMOTIDINE 40 MG/1
40 TABLET, FILM COATED ORAL 2 TIMES DAILY
COMMUNITY

## 2024-10-31 RX ORDER — GABAPENTIN 300 MG/1
300 CAPSULE ORAL 2 TIMES DAILY
Status: DISCONTINUED | OUTPATIENT
Start: 2024-10-31 | End: 2024-11-01 | Stop reason: HOSPADM

## 2024-10-31 RX ORDER — INSULIN LISPRO 100 [IU]/ML
10 INJECTION, SOLUTION INTRAVENOUS; SUBCUTANEOUS
Status: DISCONTINUED | OUTPATIENT
Start: 2024-10-31 | End: 2024-11-01

## 2024-10-31 RX ORDER — ROPINIROLE 1 MG/1
1 TABLET, FILM COATED ORAL 2 TIMES DAILY
Status: DISCONTINUED | OUTPATIENT
Start: 2024-10-31 | End: 2024-11-01 | Stop reason: HOSPADM

## 2024-10-31 RX ORDER — LABETALOL 100 MG/1
100 TABLET, FILM COATED ORAL 2 TIMES DAILY
Status: DISCONTINUED | OUTPATIENT
Start: 2024-10-31 | End: 2024-11-01 | Stop reason: HOSPADM

## 2024-10-31 RX ORDER — ONDANSETRON 4 MG/1
4 TABLET, FILM COATED ORAL EVERY 8 HOURS PRN
COMMUNITY

## 2024-10-31 RX ADMIN — ROPINIROLE HYDROCHLORIDE 1 MG: 1 TABLET, FILM COATED ORAL at 20:19

## 2024-10-31 RX ADMIN — ENOXAPARIN SODIUM 30 MG: 100 INJECTION SUBCUTANEOUS at 08:21

## 2024-10-31 RX ADMIN — INSULIN LISPRO 4 UNITS: 100 INJECTION, SOLUTION INTRAVENOUS; SUBCUTANEOUS at 20:31

## 2024-10-31 RX ADMIN — DULOXETINE HYDROCHLORIDE 60 MG: 30 CAPSULE, DELAYED RELEASE ORAL at 20:18

## 2024-10-31 RX ADMIN — ENOXAPARIN SODIUM 30 MG: 100 INJECTION SUBCUTANEOUS at 00:11

## 2024-10-31 RX ADMIN — ROPINIROLE HYDROCHLORIDE 1 MG: 1 TABLET, FILM COATED ORAL at 13:27

## 2024-10-31 RX ADMIN — BACLOFEN 10 MG: 10 TABLET ORAL at 13:27

## 2024-10-31 RX ADMIN — INSULIN LISPRO 2 UNITS: 100 INJECTION, SOLUTION INTRAVENOUS; SUBCUTANEOUS at 18:08

## 2024-10-31 RX ADMIN — POTASSIUM CHLORIDE 40 MEQ: 1500 TABLET, EXTENDED RELEASE ORAL at 04:16

## 2024-10-31 RX ADMIN — FERROUS SULFATE TAB 325 MG (65 MG ELEMENTAL FE) 325 MG: 325 (65 FE) TAB at 18:09

## 2024-10-31 RX ADMIN — AMLODIPINE BESYLATE 2.5 MG: 2.5 TABLET ORAL at 13:27

## 2024-10-31 RX ADMIN — MAGNESIUM SULFATE HEPTAHYDRATE 2000 MG: 40 INJECTION, SOLUTION INTRAVENOUS at 05:11

## 2024-10-31 RX ADMIN — SODIUM CHLORIDE, PRESERVATIVE FREE 10 ML: 5 INJECTION INTRAVENOUS at 08:23

## 2024-10-31 RX ADMIN — LABETALOL HYDROCHLORIDE 100 MG: 100 TABLET, FILM COATED ORAL at 20:18

## 2024-10-31 RX ADMIN — POTASSIUM BICARBONATE 40 MEQ: 782 TABLET, EFFERVESCENT ORAL at 13:26

## 2024-10-31 RX ADMIN — QUETIAPINE FUMARATE 200 MG: 200 TABLET, FILM COATED ORAL at 20:18

## 2024-10-31 RX ADMIN — DICYCLOMINE HYDROCHLORIDE 10 MG: 20 TABLET ORAL at 18:09

## 2024-10-31 RX ADMIN — LABETALOL HYDROCHLORIDE 100 MG: 100 TABLET, FILM COATED ORAL at 13:33

## 2024-10-31 RX ADMIN — DULOXETINE HYDROCHLORIDE 60 MG: 30 CAPSULE, DELAYED RELEASE ORAL at 13:33

## 2024-10-31 RX ADMIN — QUETIAPINE FUMARATE 200 MG: 200 TABLET, FILM COATED ORAL at 13:27

## 2024-10-31 RX ADMIN — SODIUM CHLORIDE, PRESERVATIVE FREE 10 ML: 5 INJECTION INTRAVENOUS at 20:19

## 2024-10-31 RX ADMIN — ACETAMINOPHEN 650 MG: 325 TABLET ORAL at 08:22

## 2024-10-31 RX ADMIN — ZOLPIDEM TARTRATE 10 MG: 5 TABLET ORAL at 20:19

## 2024-10-31 RX ADMIN — INSULIN LISPRO 10 UNITS: 100 INJECTION, SOLUTION INTRAVENOUS; SUBCUTANEOUS at 13:09

## 2024-10-31 RX ADMIN — MAGNESIUM SULFATE HEPTAHYDRATE 2000 MG: 40 INJECTION, SOLUTION INTRAVENOUS at 04:08

## 2024-10-31 RX ADMIN — BUSPIRONE HYDROCHLORIDE 10 MG: 5 TABLET ORAL at 20:18

## 2024-10-31 RX ADMIN — GABAPENTIN 300 MG: 300 CAPSULE ORAL at 20:18

## 2024-10-31 RX ADMIN — INSULIN LISPRO 6 UNITS: 100 INJECTION, SOLUTION INTRAVENOUS; SUBCUTANEOUS at 13:09

## 2024-10-31 RX ADMIN — POTASSIUM BICARBONATE 40 MEQ: 782 TABLET, EFFERVESCENT ORAL at 20:18

## 2024-10-31 RX ADMIN — ACETAMINOPHEN 650 MG: 325 TABLET ORAL at 20:17

## 2024-10-31 RX ADMIN — INSULIN GLARGINE 25 UNITS: 100 INJECTION, SOLUTION SUBCUTANEOUS at 20:31

## 2024-10-31 RX ADMIN — INSULIN LISPRO 10 UNITS: 100 INJECTION, SOLUTION INTRAVENOUS; SUBCUTANEOUS at 18:09

## 2024-10-31 RX ADMIN — INSULIN LISPRO 10 UNITS: 100 INJECTION, SOLUTION INTRAVENOUS; SUBCUTANEOUS at 09:20

## 2024-10-31 RX ADMIN — DEXTROSE AND SODIUM CHLORIDE: 5; 450 INJECTION, SOLUTION INTRAVENOUS at 01:40

## 2024-10-31 RX ADMIN — BACLOFEN 10 MG: 10 TABLET ORAL at 20:19

## 2024-10-31 RX ADMIN — GABAPENTIN 300 MG: 300 CAPSULE ORAL at 08:23

## 2024-10-31 RX ADMIN — BUSPIRONE HYDROCHLORIDE 10 MG: 5 TABLET ORAL at 13:27

## 2024-10-31 RX ADMIN — PRAVASTATIN SODIUM 40 MG: 40 TABLET ORAL at 13:33

## 2024-10-31 RX ADMIN — ENOXAPARIN SODIUM 30 MG: 100 INJECTION SUBCUTANEOUS at 20:17

## 2024-10-31 RX ADMIN — GABAPENTIN 300 MG: 300 CAPSULE ORAL at 01:20

## 2024-10-31 ASSESSMENT — PAIN SCALES - GENERAL
PAINLEVEL_OUTOF10: 4
PAINLEVEL_OUTOF10: 9
PAINLEVEL_OUTOF10: 8
PAINLEVEL_OUTOF10: 9
PAINLEVEL_OUTOF10: 4

## 2024-10-31 ASSESSMENT — PAIN DESCRIPTION - LOCATION
LOCATION: BACK;GENERALIZED
LOCATION: HEAD
LOCATION: HEAD

## 2024-10-31 ASSESSMENT — PAIN DESCRIPTION - DESCRIPTORS
DESCRIPTORS: ACHING
DESCRIPTORS: ACHING

## 2024-10-31 ASSESSMENT — PAIN DESCRIPTION - FREQUENCY: FREQUENCY: INTERMITTENT

## 2024-10-31 ASSESSMENT — PAIN DESCRIPTION - ORIENTATION: ORIENTATION: INNER;POSTERIOR

## 2024-10-31 ASSESSMENT — PAIN DESCRIPTION - ONSET: ONSET: ON-GOING

## 2024-10-31 ASSESSMENT — PAIN SCALES - WONG BAKER: WONGBAKER_NUMERICALRESPONSE: NO HURT

## 2024-10-31 ASSESSMENT — PAIN DESCRIPTION - PAIN TYPE: TYPE: ACUTE PAIN

## 2024-10-31 NOTE — CONSULTS
V2.0  History and Physical      Name:  Shraddha Beebe /Age/Sex: 1975  (49 y.o. female)   MRN & CSN:  [id number] & [id number] Encounter Date/Time: 10/31/2024 3:01 PM EDT   Location:  -A PCP: Cheri Tapia APRN - CNP       Hospital Day: 2    Assessment and Plan:   Shraddha Bebee is a 49 y.o. female with a pmh of DM who presents with DKA, type 1, not at goal ([facility])    Hospital Problems             Last Modified POA    * (Principal) DKA, type 1, not at goal ([facility]) 10/30/2024 Yes       DKA, type 1  Hx of IDDM  -she was started on DKA protocol with fluids and insulin drip at admission, which has been appropriately stopped after her acidosis adequately resolved. She has been transitioned to SQ insulin regimen. Her home regimen includes Lantus 40 unit nightly and sliding scale only. She does not have basal pre-meal coverage  -endocrinology team onboard  -Lantus 25 units nightly, lispro 10 unit premeal, medium dose sliding scale per endocrinology  -patient advised to establish with endocrinology and agrees to follow up with Dr. Urias outpatient as well  -plan for discharge tomorrow after an appropriate regimen is established in the next 24  hours    Essential HTN, continue amlodipine, labetalol  Anxiety / depressive disorder, continue buspirone, duloxetine, sertraline  Insomnia, continue zolpidem   Restless leg syndrome, continue ropinirole  Mixed HLD, continue pravastatin     Disposition:     Diet ADULT DIET; Regular; 4 carb choices (60 gm/meal)   DVT Prophylaxis [] Lovenox, []  Heparin, [] SCDs, [] Ambulation,  [] Eliquis, [] Xarelto  [] Coumadin   Peptic ulcer prophylaxis -   Code Status Full Code   Disposition From / Current living situation : home  Expected Disposition: home  Estimated Date of Discharge: tomorrow  Patient requires continued admission due to DKA   Surrogate Decision Maker/ POA -     Goals status was discussed in detail with patient .  Verbalized

## 2024-10-31 NOTE — PROGRESS NOTES
POC Glucose 138 (H) 74 - 99 mg/dL   POCT Glucose    Collection Time: 10/31/24  7:07 AM   Result Value Ref Range    POC Glucose 144 (H) 74 - 99 mg/dL   POCT Glucose    Collection Time: 10/31/24  8:08 AM   Result Value Ref Range    POC Glucose 197 (H) 74 - 99 mg/dL   POCT Glucose    Collection Time: 10/31/24  9:15 AM   Result Value Ref Range    POC Glucose 244 (H) 74 - 99 mg/dL   POCT Glucose    Collection Time: 10/31/24 10:08 AM   Result Value Ref Range    POC Glucose 214 (H) 74 - 99 mg/dL   Basic Metabolic Panel    Collection Time: 10/31/24 10:10 AM   Result Value Ref Range    Sodium 138 136 - 145 mmol/L    Potassium 3.3 (L) 3.5 - 5.1 mmol/L    Chloride 99 99 - 110 mmol/L    CO2 27 21 - 32 mmol/L    Anion Gap 13 9 - 17 mmol/L    Glucose 229 (H) 74 - 99 mg/dL    BUN 13 7 - 20 mg/dL    Creatinine 0.6 0.6 - 1.1 mg/dL    Est, Glom Filt Rate >90 >60 mL/min/1.73m2    Calcium 9.0 8.3 - 10.6 mg/dL   Magnesium    Collection Time: 10/31/24 10:10 AM   Result Value Ref Range    Magnesium 1.9 1.8 - 2.4 mg/dL   Phosphorus    Collection Time: 10/31/24 10:10 AM   Result Value Ref Range    Phosphorus 3.1 2.5 - 4.9 mg/dL        Imaging/Diagnostics Last 24 Hours   XR KNEE RIGHT (3 VIEWS)    Result Date: 10/30/2024  Right Knee INDICATION: Pain COMPARISON:  None TECHNIQUE: Three views of the right knee were obtained FINDINGS:  There is no evidence of fracture or other acute bony abnormality. No bony lesions are seen. There is no joint effusion. Postsurgical change from ACL reconstruction present. Lateral compartment chondrocalcinosis present.     No fracture or hardware complication. Chondrocalcinosis noted. Electronically signed by Jerome Foster MD    CT ABDOMEN PELVIS WO CONTRAST Additional Contrast? None    Result Date: 10/30/2024  CT ABDOMEN PELVIS WO CONTRAST HISTORY: ams/n/v/hyperglycemia  COMPARISON: 6/24/2024 TECHNIQUE: Helical CT imaging of the abdomen and pelvis was performed without intravenous contrast. Multiplanar  acute cervical fracture is seen. No prevertebral soft tissue swelling. There is normal cervical alignment. The disc spaces and facet joints are preserved. The spinal canal is grossly patent.     No acute intracranial findings. No acute fracture or subluxation of the cervical spine. Electronically signed by Jos Charlton    CT CERVICAL SPINE WO CONTRAST    Result Date: 10/30/2024  CT of the Head and Cervical Spine HISTORY: HEAD TRAUMA, CLOSED, MILD, GCS >= 13, NO RISK FACTORS, NEURO EXAM NORMAL; ams/fall TECHNIQUE: CT scan of the brain and cervical spine without IV contrast. Coronal and sagittal reconstructions were obtained. Radiation dose reduction techniques were used for this study:  All CT scans performed at this facility use one or all of the following: Automated exposure control, adjustment of the mA and/or kVp according to patient's size, iterative reconstruction. FINDINGS: HEAD:  The ventricles, cisterns, and sulci are age-appropriate. No evidence of acute infarction, intracranial hemorrhage, extra-axial fluid collection, or midline shift. The sinuses are clear. No depressed skull fracture. CERVICAL SPINE:  Very limited study due to motion artifact. Fixation hardware at the C5-C6 level. No acute cervical fracture is seen. No prevertebral soft tissue swelling. There is normal cervical alignment. The disc spaces and facet joints are preserved. The spinal canal is grossly patent.     No acute intracranial findings. No acute fracture or subluxation of the cervical spine. Electronically signed by Jos Charlton      Electronically signed by Ophelia Saldaña PA-C on 10/31/2024 at 12:30 PM

## 2024-10-31 NOTE — CARE COORDINATION
Cm met with pt to initiate discharge planning. Cm introduced self and role of CM. Pt admitted with DKA. Pt, son and son in law reside together in a 1 story home. Pt's son and son in law work outside the home on Sat, Sun, Mon each week. Pt receives foodstamps, has walker for long distance and a glucometer. Pt and son drive. Pt has PCP and ins. Pt anticipates a discharge to home and expresses no need or concerns for discharge.   10/31/24 1350   Service Assessment   Patient Orientation Alert and Oriented   Cognition Alert   History Provided By Patient;Medical Record   Primary Caregiver Family   Accompanied By/Relationship N/A   Support Systems Children  (son and son in law)   Patient's Healthcare Decision Maker is: Legal Next of Kin   PCP Verified by CM Yes   Last Visit to PCP   (Unknown)   Prior Functional Level Independent in ADLs/IADLs   Current Functional Level Independent in ADLs/IADLs   Can patient return to prior living arrangement Yes   Ability to make needs known: Good   Family able to assist with home care needs: Yes   Would you like for me to discuss the discharge plan with any other family members/significant others, and if so, who? Yes   Financial Resources Medicaid;Medicare   Community Resources None  (discharge planning assessment)

## 2024-10-31 NOTE — ED PROVIDER NOTES
Patient handed over to me by colleague Dr. Carbajal pending laboratory studies and admission to the hospital.  Patient came in for hyperglycemia and altered mental status.  Patient was ordered labs, Zofran 4 mg IV, 1 L bolus normal saline IV fluids and multiple CT scans and x-rays.  Patient found to have elevated white count of 23,000 and in DKA.  Patient was ordered blood cultures lactic acid Rocephin 2 g IV.  Insulin bolus and drip, a second liter bolus normal saline IV fluids.  I have consulted ICU for admission.  Dr. Butler will be admitting patient for further evaluation treatment and management.    BP (!) 162/115   Pulse 83   Temp 97.8 °F (36.6 °C) (Oral)   Resp 15   Ht 1.575 m (5' 2\")   Wt 104.3 kg (230 lb)   SpO2 97%   BMI 42.07 kg/m²       /78   Pulse 83   Temp 97.8 °F (36.6 °C) (Oral)   Resp 15   Ht 1.575 m (5' 2\")   Wt 104.3 kg (230 lb)   SpO2 90%   BMI 42.07 kg/m²     Labs Reviewed   CBC WITH AUTO DIFFERENTIAL - Abnormal; Notable for the following components:       Result Value    WBC 23.3 (*)     Hemoglobin 12.2 (*)     MCHC 31.9 (*)     MPV 12.2 (*)     Neutrophils % 82 (*)     Lymphocytes % 14 (*)     All other components within normal limits   COMPREHENSIVE METABOLIC PANEL - Abnormal; Notable for the following components:    Sodium 135 (*)     Chloride 96 (*)     Anion Gap 18 (*)     Glucose 488 (*)     Albumin/Globulin Ratio 0.8 (*)     All other components within normal limits   URINALYSIS - Abnormal; Notable for the following components:    Glucose, Ur >=1000 (*)     Ketones, Urine 15 (*)     Specific Gravity, UA <1.005 (*)     All other components within normal limits   BETA-HYDROXYBUTYRATE - Abnormal; Notable for the following components:    Beta-Hydroxybutyrate 2.08 (*)     All other components within normal limits   URINE DRUG SCREEN - Abnormal; Notable for the following components:    Cannabinoid Scrn, Ur POSITIVE (*)     All other components within normal limits

## 2024-10-31 NOTE — PROGRESS NOTES
4 Eyes Skin Assessment     NAME:  Shraddha Beebe  YOB: 1975  MEDICAL RECORD NUMBER:  [id number]    The patient is being assessed for  Admission    I agree that at least one RN has performed a thorough Head to Toe Skin Assessment on the patient. ALL assessment sites listed below have been assessed.      Areas assessed by both nurses:    Head, Face, Ears, Shoulders, Back, Chest, Arms, Elbows, Hands, Sacrum. Buttock, Coccyx, Ischium, Legs. Feet and Heels, and Under Medical Devices         Does the Patient have a Wound? No noted wound(s)       Campbell Prevention initiated by RN: Yes  Wound Care Orders initiated by RN: No    Pressure Injury (Stage 3,4, Unstageable, DTI, NWPT, and Complex wounds) if present, place Wound referral order by RN under : No    New Ostomies, if present place, Ostomy referral order under : No     Nurse 1 eSignature: Electronically signed by Daja Hampton RN on 10/31/24 at 1:04 AM EDT    **SHARE this note so that the co-signing nurse can place an eSignature**    Nurse 2 eSignature: Electronically signed by Mary Perez RN on 10/31/24 at 5:42 AM EDT

## 2024-10-31 NOTE — CONSULTS
Endocrinology   Consult Note  10/30/2024  4:42 PM     Primary Care provider: Cheri Tapia, APRN - CNP     Referring physician:  Aubrey Butler DO     Dear Doctor  Luke//     Thank You for the Consult     Pt. Was Admitted for : Altered mental state with hyperglycemia and noncompliance with the DKA    Reason for Consult: Better control of blood glucose      History Obtained From:  Patient/ EMR       HISTORY OF PRESENT ILLNESS:                The patient is a 49 y.o. female with significant past medical history of diabetes mellitusHas been not been taking her medicine for some time comes in with hyperglycemia was found to be in DKA laboratory testing.  Patient had some nausea initially. I was  consulted for better control of blood glucose.       ROS:   Pt's ROS done in detail.  Abnormal ROS are noted in Medical and Surgical History Section below:     Other Medical History:    No past medical history on file.  Surgical History:        Procedure Laterality Date    CT VISCERAL PERCUTANEOUS DRAIN  11/2/2015    CT VISCERAL PERCUTANEOUS DRAIN 11/2/2015       Allergies:  Aspirin, Diphenhydramine, Ketorolac, Lisinopril, Metaxalone, Morphine, Spinach, Sulfa antibiotics, and Vancomycin    Family History:   No family history on file.  REVIEW OF SYSTEMS:  Review of System Done as noted above     PHYSICAL EXAM:      Vitals:    /75   Pulse 80   Temp 99 °F (37.2 °C) (Oral)   Resp 20   Ht 1.575 m (5' 2\")   Wt 97.9 kg (215 lb 13.3 oz)   SpO2 92%   BMI 39.48 kg/m²     CONSTITUTIONAL:  awake, alert, cooperative, appears stated age   EYES:  vision intact Fundoscopic Exam not performed   ENT:Normal  NECK:  Supple, No JVD.   Thyroid Exam:Normal   LUNGS:  Has Vesicular Breath Sounds,   CARDIOVASCULAR:  Normal apical impulse, regular rate and rhythm, normal S1 and S2, no S3 or S4, and has no  murmur   ABDOMEN:  No scars, normal bowel sounds, soft, non-distended, non-tender, no masses palpated, no  subluxation of the cervical spine.         Electronically signed by Jos Charlton      XR CHEST PORTABLE   Final Result   No acute findings in the chest         Electronically signed by Aubrey Staples             Scheduled Medicines   Medications:    gabapentin  300 mg Oral BID    sodium chloride flush  5-40 mL IntraVENous 2 times per day    enoxaparin  30 mg SubCUTAneous BID      Infusions:    sodium chloride Stopped (10/31/24 0139)    dextrose 5 % and 0.45 % NaCl 150 mL/hr at 10/31/24 0140    insulin 0.8 Units/hr (10/31/24 0710)    sodium chloride           IMPRESSION    Patient Active Problem List   Diagnosis    DKA, type 1, not at goal ([facility])         RECOMMENDATIONS:      Reviewed POC blood glucose . Labs and X ray results   Reviewed Home and Current Medicines  Discontinue IV insulin drip  Will Start On meal/ Correction bolus Humalog/ Lantus Insulin regime Monitor Blood glucose frequently   Modify  the dose of Insulin  as needed        Will follow with you  Again thank you for sharing pt's care with me.     Truly yours,       ARMINDA Urias MD

## 2024-10-31 NOTE — PROGRESS NOTES
Patient placed on room air. Pt states at baseline her 02 reads about 89%-90%. Dr. Sterling updated. New orders to Keep sp02 98% or above

## 2024-10-31 NOTE — H&P
Status: Living with partner   Intimate Partner Violence: Not on file   Housing Stability: Not on file     No family history on file.    Home Medications:  Prior to Admission medications    Not on File         Physical Exam:     BP (!) 143/68   Pulse (!) 117   Temp 97.8 °F (36.6 °C) (Oral)   Resp 20   Ht 1.575 m (5' 2\")   Wt 104.3 kg (230 lb)   SpO2 91%   BMI 42.07 kg/m²     General: NAD, AOx1  Eyes: EOMI  ENT: neck supple  Cardiovascular: Regular rate.  Respiratory: Clear to auscultation  Gastrointestinal: Soft, non tender  Genitourinary: no suprapubic tenderness  Musculoskeletal: No edema  Skin: warm, dry  Neuro: Alert, but mildly confused. Obeys commands x 4.   Psych: Mood appropriate.         Labs, Imaging, and Studies reviewed:    XR KNEE RIGHT (3 VIEWS)    Result Date: 10/30/2024  Right Knee INDICATION: Pain COMPARISON:  None TECHNIQUE: Three views of the right knee were obtained FINDINGS:  There is no evidence of fracture or other acute bony abnormality. No bony lesions are seen. There is no joint effusion. Postsurgical change from ACL reconstruction present. Lateral compartment chondrocalcinosis present.     No fracture or hardware complication. Chondrocalcinosis noted. Electronically signed by Jerome Foster MD    CT ABDOMEN PELVIS WO CONTRAST Additional Contrast? None    Result Date: 10/30/2024  CT ABDOMEN PELVIS WO CONTRAST HISTORY: ams/n/v/hyperglycemia  COMPARISON: 6/24/2024 TECHNIQUE: Helical CT imaging of the abdomen and pelvis was performed without intravenous contrast. Multiplanar reformats were generated. One or more dose reduction techniques were used on this CT: automated exposure control, adjustment of the mAs and/or kVp according to patient size, and iterative reconstruction techniques.  FINDINGS: LOWER CHEST: Large sliding hiatal hernia. LIVER: Unremarkable. BILIARY/GALLBLADDER: Postsurgical SPLEEN: Splenosis PANCREAS: Unremarkable. ADRENAL GLANDS: Unremarkable. KIDNEYS: Unremarkable.  BLADDER: Unremarkable. REPRODUCTIVE: Hysterectomy BOWEL: Unremarkable. GREAT VESSELS:  Unremarkable for age. LYMPH NODES: Unremarkable. BODY WALL/ MSK: Postsurgical changes lumbosacral spine.     No acute abdominal or pelvic findings. Electronically signed by [employer]    XR CHEST PORTABLE    Result Date: 10/30/2024  Chest X-ray INDICATION: Shortness of breath, altered mental status COMPARISON:  None TECHNIQUE: AP/PA view of the chest was obtained. FINDINGS: The lungs are clear. There are no infiltrates or effusions. Cardiomegaly..  The bony thorax is intact.      No acute findings in the chest Electronically signed by Aubrey Staples    CT HEAD WO CONTRAST    Result Date: 10/30/2024  CT of the Head and Cervical Spine HISTORY: HEAD TRAUMA, CLOSED, MILD, GCS >= 13, NO RISK FACTORS, NEURO EXAM NORMAL; ams/fall TECHNIQUE: CT scan of the brain and cervical spine without IV contrast. Coronal and sagittal reconstructions were obtained. Radiation dose reduction techniques were used for this study:  All CT scans performed at this facility use one or all of the following: Automated exposure control, adjustment of the mA and/or kVp according to patient's size, iterative reconstruction. FINDINGS: HEAD:  The ventricles, cisterns, and sulci are age-appropriate. No evidence of acute infarction, intracranial hemorrhage, extra-axial fluid collection, or midline shift. The sinuses are clear. No depressed skull fracture. CERVICAL SPINE:  Very limited study due to motion artifact. Fixation hardware at the C5-C6 level. No acute cervical fracture is seen. No prevertebral soft tissue swelling. There is normal cervical alignment. The disc spaces and facet joints are preserved. The spinal canal is grossly patent.     No acute intracranial findings. No acute fracture or subluxation of the cervical spine. Electronically signed by [employer]    CT CERVICAL SPINE WO CONTRAST    Result Date: 10/30/2024  CT of the Head and Cervical Spine HISTORY:

## 2024-10-31 NOTE — PROGRESS NOTES
1010 Labs sent.    1112- lab called regarding no results. States they do not have orders.  Re-ordered.

## 2024-10-31 NOTE — ED NOTES
Called report to Daja in ICU, insulin not started in the ED due to not being able to access insulin calculator because of medication order conflict. Receiving nurse is aware and said is okay to bring pt up now.

## 2024-11-01 ENCOUNTER — APPOINTMENT (OUTPATIENT)
Dept: GENERAL RADIOLOGY | Age: 49
End: 2024-11-01
Payer: MEDICARE

## 2024-11-01 VITALS
OXYGEN SATURATION: 91 % | SYSTOLIC BLOOD PRESSURE: 147 MMHG | BODY MASS INDEX: 39.76 KG/M2 | TEMPERATURE: 98.5 F | HEART RATE: 86 BPM | HEIGHT: 62 IN | DIASTOLIC BLOOD PRESSURE: 86 MMHG | RESPIRATION RATE: 18 BRPM | WEIGHT: 216.05 LBS

## 2024-11-01 PROBLEM — E10.10 DKA, TYPE 1, NOT AT GOAL (HCC): Status: RESOLVED | Noted: 2024-10-30 | Resolved: 2024-11-01

## 2024-11-01 PROBLEM — S92.422A DISPLACED FRACTURE OF DISTAL PHALANX OF LEFT GREAT TOE, INITIAL ENCOUNTER FOR CLOSED FRACTURE: Status: ACTIVE | Noted: 2024-11-01

## 2024-11-01 LAB
ANION GAP SERPL CALCULATED.3IONS-SCNC: 12 MMOL/L (ref 9–17)
BUN SERPL-MCNC: 15 MG/DL (ref 7–20)
CALCIUM SERPL-MCNC: 8.7 MG/DL (ref 8.3–10.6)
CHLORIDE SERPL-SCNC: 101 MMOL/L (ref 99–110)
CO2 SERPL-SCNC: 25 MMOL/L (ref 21–32)
CREAT SERPL-MCNC: 0.6 MG/DL (ref 0.6–1.1)
CRP SERPL HS-MCNC: 30.1 MG/L (ref 0–5)
ERYTHROCYTE [DISTWIDTH] IN BLOOD BY AUTOMATED COUNT: 15.7 % (ref 11.7–14.9)
ERYTHROCYTE [SEDIMENTATION RATE] IN BLOOD BY WESTERGREN METHOD: 51 MM/HR (ref 0–20)
GFR, ESTIMATED: >90 ML/MIN/1.73M2
GLUCOSE BLD-MCNC: 275 MG/DL (ref 74–99)
GLUCOSE BLD-MCNC: 290 MG/DL (ref 74–99)
GLUCOSE SERPL-MCNC: 285 MG/DL (ref 74–99)
HCT VFR BLD AUTO: 37.2 % (ref 37–47)
HGB BLD-MCNC: 11.2 G/DL (ref 12.5–16)
MAGNESIUM SERPL-MCNC: 1.5 MG/DL (ref 1.8–2.4)
MCH RBC QN AUTO: 28.8 PG (ref 27–31)
MCHC RBC AUTO-ENTMCNC: 30.1 G/DL (ref 32–36)
MCV RBC AUTO: 95.6 FL (ref 78–100)
PLATELET # BLD AUTO: 331 K/UL (ref 140–440)
PMV BLD AUTO: 12.3 FL (ref 7.5–11.1)
POTASSIUM SERPL-SCNC: 3.9 MMOL/L (ref 3.5–5.1)
RBC # BLD AUTO: 3.89 M/UL (ref 4.2–5.4)
SODIUM SERPL-SCNC: 137 MMOL/L (ref 136–145)
URATE SERPL-MCNC: 5 MG/DL (ref 2.6–6)
WBC OTHER # BLD: 15.8 K/UL (ref 4–10.5)

## 2024-11-01 PROCEDURE — 80048 BASIC METABOLIC PNL TOTAL CA: CPT

## 2024-11-01 PROCEDURE — 6370000000 HC RX 637 (ALT 250 FOR IP): Performed by: INTERNAL MEDICINE

## 2024-11-01 PROCEDURE — 73620 X-RAY EXAM OF FOOT: CPT

## 2024-11-01 PROCEDURE — 6360000002 HC RX W HCPCS: Performed by: INTERNAL MEDICINE

## 2024-11-01 PROCEDURE — 2580000003 HC RX 258: Performed by: INTERNAL MEDICINE

## 2024-11-01 PROCEDURE — 36415 COLL VENOUS BLD VENIPUNCTURE: CPT

## 2024-11-01 PROCEDURE — 85027 COMPLETE CBC AUTOMATED: CPT

## 2024-11-01 PROCEDURE — 84550 ASSAY OF BLOOD/URIC ACID: CPT

## 2024-11-01 PROCEDURE — 6370000000 HC RX 637 (ALT 250 FOR IP): Performed by: PHYSICIAN ASSISTANT

## 2024-11-01 PROCEDURE — 82962 GLUCOSE BLOOD TEST: CPT

## 2024-11-01 PROCEDURE — 83735 ASSAY OF MAGNESIUM: CPT

## 2024-11-01 PROCEDURE — 85652 RBC SED RATE AUTOMATED: CPT

## 2024-11-01 PROCEDURE — 94761 N-INVAS EAR/PLS OXIMETRY MLT: CPT

## 2024-11-01 PROCEDURE — 86140 C-REACTIVE PROTEIN: CPT

## 2024-11-01 RX ORDER — OXYCODONE AND ACETAMINOPHEN 5; 325 MG/1; MG/1
1 TABLET ORAL EVERY 4 HOURS PRN
Status: DISCONTINUED | OUTPATIENT
Start: 2024-11-01 | End: 2024-11-01 | Stop reason: HOSPADM

## 2024-11-01 RX ORDER — HYDROCODONE BITARTRATE AND ACETAMINOPHEN 5; 325 MG/1; MG/1
1 TABLET ORAL EVERY 6 HOURS PRN
Status: DISCONTINUED | OUTPATIENT
Start: 2024-11-01 | End: 2024-11-01

## 2024-11-01 RX ORDER — INSULIN GLARGINE 100 [IU]/ML
30 INJECTION, SOLUTION SUBCUTANEOUS NIGHTLY
Status: DISCONTINUED | OUTPATIENT
Start: 2024-11-01 | End: 2024-11-01 | Stop reason: HOSPADM

## 2024-11-01 RX ORDER — OXYCODONE AND ACETAMINOPHEN 5; 325 MG/1; MG/1
2 TABLET ORAL EVERY 4 HOURS PRN
Status: DISCONTINUED | OUTPATIENT
Start: 2024-11-01 | End: 2024-11-01 | Stop reason: HOSPADM

## 2024-11-01 RX ORDER — INSULIN LISPRO 100 [IU]/ML
15 INJECTION, SOLUTION INTRAVENOUS; SUBCUTANEOUS
Status: DISCONTINUED | OUTPATIENT
Start: 2024-11-01 | End: 2024-11-01 | Stop reason: HOSPADM

## 2024-11-01 RX ORDER — HYDROMORPHONE HYDROCHLORIDE 1 MG/ML
0.5 INJECTION, SOLUTION INTRAMUSCULAR; INTRAVENOUS; SUBCUTANEOUS EVERY 4 HOURS PRN
Status: DISCONTINUED | OUTPATIENT
Start: 2024-11-01 | End: 2024-11-01 | Stop reason: HOSPADM

## 2024-11-01 RX ORDER — INSULIN LISPRO 100 [IU]/ML
15 INJECTION, SOLUTION INTRAVENOUS; SUBCUTANEOUS
Qty: 5 ADJUSTABLE DOSE PRE-FILLED PEN SYRINGE | Refills: 3 | Status: SHIPPED | OUTPATIENT
Start: 2024-11-01

## 2024-11-01 RX ORDER — OXYCODONE AND ACETAMINOPHEN 5; 325 MG/1; MG/1
1 TABLET ORAL EVERY 6 HOURS PRN
Qty: 12 TABLET | Refills: 0 | Status: SHIPPED | OUTPATIENT
Start: 2024-11-01 | End: 2024-11-04

## 2024-11-01 RX ORDER — INSULIN GLARGINE 100 [IU]/ML
40 INJECTION, SOLUTION SUBCUTANEOUS NIGHTLY
Qty: 10 ML | Refills: 3 | Status: SHIPPED | OUTPATIENT
Start: 2024-11-01

## 2024-11-01 RX ADMIN — BACLOFEN 10 MG: 10 TABLET ORAL at 09:17

## 2024-11-01 RX ADMIN — LABETALOL HYDROCHLORIDE 100 MG: 100 TABLET, FILM COATED ORAL at 09:18

## 2024-11-01 RX ADMIN — ENOXAPARIN SODIUM 30 MG: 100 INJECTION SUBCUTANEOUS at 09:19

## 2024-11-01 RX ADMIN — INSULIN LISPRO 4 UNITS: 100 INJECTION, SOLUTION INTRAVENOUS; SUBCUTANEOUS at 09:20

## 2024-11-01 RX ADMIN — DICYCLOMINE HYDROCHLORIDE 10 MG: 20 TABLET ORAL at 09:16

## 2024-11-01 RX ADMIN — FERROUS SULFATE TAB 325 MG (65 MG ELEMENTAL FE) 325 MG: 325 (65 FE) TAB at 09:19

## 2024-11-01 RX ADMIN — GABAPENTIN 300 MG: 300 CAPSULE ORAL at 09:21

## 2024-11-01 RX ADMIN — OXYCODONE HYDROCHLORIDE AND ACETAMINOPHEN 1 TABLET: 5; 325 TABLET ORAL at 14:22

## 2024-11-01 RX ADMIN — MAGNESIUM SULFATE HEPTAHYDRATE 3000 MG: 500 INJECTION, SOLUTION INTRAMUSCULAR; INTRAVENOUS at 12:13

## 2024-11-01 RX ADMIN — INSULIN HUMAN 5 UNITS: 100 INJECTION, SUSPENSION SUBCUTANEOUS at 09:26

## 2024-11-01 RX ADMIN — PRAVASTATIN SODIUM 40 MG: 40 TABLET ORAL at 11:07

## 2024-11-01 RX ADMIN — BUSPIRONE HYDROCHLORIDE 10 MG: 5 TABLET ORAL at 14:22

## 2024-11-01 RX ADMIN — BACLOFEN 10 MG: 10 TABLET ORAL at 14:22

## 2024-11-01 RX ADMIN — INSULIN LISPRO 15 UNITS: 100 INJECTION, SOLUTION INTRAVENOUS; SUBCUTANEOUS at 09:20

## 2024-11-01 RX ADMIN — QUETIAPINE FUMARATE 200 MG: 200 TABLET, FILM COATED ORAL at 14:22

## 2024-11-01 RX ADMIN — DICYCLOMINE HYDROCHLORIDE 10 MG: 20 TABLET ORAL at 06:07

## 2024-11-01 RX ADMIN — BUSPIRONE HYDROCHLORIDE 10 MG: 5 TABLET ORAL at 09:18

## 2024-11-01 RX ADMIN — INSULIN LISPRO 4 UNITS: 100 INJECTION, SOLUTION INTRAVENOUS; SUBCUTANEOUS at 12:13

## 2024-11-01 RX ADMIN — AMLODIPINE BESYLATE 2.5 MG: 2.5 TABLET ORAL at 09:16

## 2024-11-01 RX ADMIN — QUETIAPINE FUMARATE 200 MG: 200 TABLET, FILM COATED ORAL at 09:16

## 2024-11-01 RX ADMIN — INSULIN LISPRO 15 UNITS: 100 INJECTION, SOLUTION INTRAVENOUS; SUBCUTANEOUS at 12:13

## 2024-11-01 RX ADMIN — HYDROCODONE BITARTRATE AND ACETAMINOPHEN 1 TABLET: 5; 325 TABLET ORAL at 11:07

## 2024-11-01 RX ADMIN — DICYCLOMINE HYDROCHLORIDE 10 MG: 20 TABLET ORAL at 14:22

## 2024-11-01 RX ADMIN — ROPINIROLE HYDROCHLORIDE 1 MG: 1 TABLET, FILM COATED ORAL at 09:16

## 2024-11-01 RX ADMIN — SODIUM CHLORIDE, PRESERVATIVE FREE 10 ML: 5 INJECTION INTRAVENOUS at 09:18

## 2024-11-01 RX ADMIN — ACETAMINOPHEN 650 MG: 325 TABLET ORAL at 06:05

## 2024-11-01 RX ADMIN — DULOXETINE HYDROCHLORIDE 60 MG: 30 CAPSULE, DELAYED RELEASE ORAL at 09:17

## 2024-11-01 ASSESSMENT — PAIN DESCRIPTION - DESCRIPTORS
DESCRIPTORS: POUNDING
DESCRIPTORS: ACHING
DESCRIPTORS: POUNDING
DESCRIPTORS: DISCOMFORT

## 2024-11-01 ASSESSMENT — PAIN DESCRIPTION - LOCATION
LOCATION: BACK
LOCATION: HEAD
LOCATION: TOE (COMMENT WHICH ONE)
LOCATION: TOE (COMMENT WHICH ONE)

## 2024-11-01 ASSESSMENT — PAIN - FUNCTIONAL ASSESSMENT
PAIN_FUNCTIONAL_ASSESSMENT: ACTIVITIES ARE NOT PREVENTED

## 2024-11-01 ASSESSMENT — PAIN SCALES - GENERAL
PAINLEVEL_OUTOF10: 7
PAINLEVEL_OUTOF10: 10
PAINLEVEL_OUTOF10: 10
PAINLEVEL_OUTOF10: 6

## 2024-11-01 ASSESSMENT — PAIN DESCRIPTION - ORIENTATION
ORIENTATION: LOWER
ORIENTATION: LEFT
ORIENTATION: ANTERIOR

## 2024-11-01 NOTE — PROGRESS NOTES
4 Eyes Skin Assessment     NAME:  Shraddha Beebe  YOB: 1975  MEDICAL RECORD NUMBER:  [id number]    The patient is being assessed for  {Reason for Assessment:75729}    I agree that at least one RN has performed a thorough Head to Toe Skin Assessment on the patient. ALL assessment sites listed below have been assessed.      Areas assessed by both nurses:    Head, Face, Ears, Shoulders, Back, Chest, Arms, Elbows, Hands, Sacrum. Buttock, Coccyx, Ischium, Legs. Feet and Heels, and Under Medical Devices         Does the Patient have a Wound? No noted wound(s)       Campbell Prevention initiated by RN: No  Wound Care Orders initiated by RN: No    Pressure Injury (Stage 3,4, Unstageable, DTI, NWPT, and Complex wounds) if present, place Wound referral order by RN under : No    New Ostomies, if present place, Ostomy referral order under : No     Nurse 1 eSignature: Electronically signed by Martell Metcalf RN on 11/1/24 at 7:05 AM EDT    **SHARE this note so that the co-signing nurse can place an eSignature**    Nurse 2 eSignature: {Esignature:[id number]}

## 2024-11-01 NOTE — CONSULTS
Orthopaedic Consult    Patient Name: Shraddha Beebe   (1975)  MRN   [id number]   Today's date:  11/1/2024     CHIEF COMPLAINT:  \"I fell on Tuesday and hurt my toe\"    History Obtained From:  patient    HISTORY OF PRESENT ILLNESS:      The patient is a 49 y.o. female  who presents with left big toe pain after falling at home Tuesday or Wednesday. Patient is unsure. Patient was found in DKA and may have fallen during this episode. She does not recall a fall or accident, but upon treatment in hospital, received an xray of left foot which found a fracture of the medial base of the left first proximal phalanx.    Orthopedic consult requested for toe fracture.    Past Medical History   No past medical history on file.    Past Surgical History         Procedure Laterality Date    CT VISCERAL PERCUTANEOUS DRAIN  11/2/2015    CT VISCERAL PERCUTANEOUS DRAIN 11/2/2015       Medications Prior to Admission:     Prior to Admission medications    Medication Sig Start Date End Date Taking? Authorizing Provider   rOPINIRole (REQUIP) 1 MG tablet Take 1 tablet by mouth daily   Yes Cisco Stone MD   rOPINIRole (REQUIP) 1 MG tablet Take 3 tablets by mouth nightly   Yes Cisco Stone MD   zolpidem (AMBIEN) 10 MG tablet Take 1 tablet by mouth nightly. Max Daily Amount: 10 mg   Yes Cisco Stone MD   QUEtiapine (SEROQUEL) 100 MG tablet Take 2 tablets by mouth 3 times daily   Yes Cisoc Stone MD   omeprazole (PRILOSEC) 20 MG delayed release capsule Take 2 capsules by mouth 2 times daily   Yes Cisco Stone MD   dicyclomine (BENTYL) 10 MG capsule Take 1 capsule by mouth in the morning, at noon, and at bedtime   Yes Cisco Stone MD   famotidine (PEPCID) 40 MG tablet Take 1 tablet by mouth 2 times daily   Yes Cisco Stone MD   DULoxetine (CYMBALTA) 60 MG extended release capsule Take 1 capsule by mouth 2 times

## 2024-11-01 NOTE — PLAN OF CARE
Problem: Chronic Conditions and Co-morbidities  Goal: Patient's chronic conditions and co-morbidity symptoms are monitored and maintained or improved  Outcome: Adequate for Discharge     Problem: Discharge Planning  Goal: Discharge to home or other facility with appropriate resources  Outcome: Adequate for Discharge     Problem: Safety - Adult  Goal: Free from fall injury  Outcome: Adequate for Discharge     Problem: Skin/Tissue Integrity  Goal: Absence of new skin breakdown  Description: 1.  Monitor for areas of redness and/or skin breakdown  2.  Assess vascular access sites hourly  3.  Every 4-6 hours minimum:  Change oxygen saturation probe site  4.  Every 4-6 hours:  If on nasal continuous positive airway pressure, respiratory therapy assess nares and determine need for appliance change or resting period.  Outcome: Adequate for Discharge     Problem: Pain  Goal: Verbalizes/displays adequate comfort level or baseline comfort level  Outcome: Adequate for Discharge     
  Problem: Chronic Conditions and Co-morbidities  Goal: Patient's chronic conditions and co-morbidity symptoms are monitored and maintained or improved  Outcome: Progressing  Flowsheets (Taken 10/30/2024 2335 by Daja Hampton, RN)  Care Plan - Patient's Chronic Conditions and Co-Morbidity Symptoms are Monitored and Maintained or Improved:   Monitor and assess patient's chronic conditions and comorbid symptoms for stability, deterioration, or improvement   Collaborate with multidisciplinary team to address chronic and comorbid conditions and prevent exacerbation or deterioration   Update acute care plan with appropriate goals if chronic or comorbid symptoms are exacerbated and prevent overall improvement and discharge     Problem: Discharge Planning  Goal: Discharge to home or other facility with appropriate resources  Outcome: Progressing  Flowsheets (Taken 10/30/2024 2335 by Daja Hampton, RN)  Discharge to home or other facility with appropriate resources:   Identify barriers to discharge with patient and caregiver   Arrange for needed discharge resources and transportation as appropriate   Identify discharge learning needs (meds, wound care, etc)   Refer to discharge planning if patient needs post-hospital services based on physician order or complex needs related to functional status, cognitive ability or social support system     Problem: Safety - Adult  Goal: Free from fall injury  Outcome: Progressing     Problem: Skin/Tissue Integrity  Goal: Absence of new skin breakdown  Description: 1.  Monitor for areas of redness and/or skin breakdown  2.  Assess vascular access sites hourly  3.  Every 4-6 hours minimum:  Change oxygen saturation probe site  4.  Every 4-6 hours:  If on nasal continuous positive airway pressure, respiratory therapy assess nares and determine need for appliance change or resting period.  Outcome: Progressing     Problem: Confusion  Goal: Confusion, delirium, dementia, or psychosis is 
Change oxygen saturation probe site  4.  Every 4-6 hours:  If on nasal continuous positive airway pressure, respiratory therapy assess nares and determine need for appliance change or resting period.  10/31/2024 2153 by Tammie Matson RN  Outcome: Progressing  10/31/2024 1115 by Payton Pa RN  Outcome: Progressing     Problem: Confusion  Goal: Confusion, delirium, dementia, or psychosis is improved or at baseline  Description: INTERVENTIONS:  1. Assess for possible contributors to thought disturbance, including medications, impaired vision or hearing, underlying metabolic abnormalities, dehydration, psychiatric diagnoses, and notify attending LIP  2. [place] high risk fall precautions, as indicated  3. Provide frequent short contacts to provide reality reorientation, refocusing and direction  4. Decrease environmental stimuli, including noise as appropriate  5. Monitor and intervene to maintain adequate nutrition, hydration, elimination, sleep and activity  6. If unable to ensure safety without constant attention obtain sitter and review sitter guidelines with assigned personnel  7. Initiate Psychosocial CNS and Spiritual Care consult, as indicated  10/31/2024 2153 by Tammie Matson RN  Outcome: Progressing  10/31/2024 1115 by Payton Pa RN  Outcome: Progressing  Flowsheets (Taken 10/30/2024 2335 by Daja Hampton, RN)  Effect of thought disturbance (confusion, delirium, dementia, or psychosis) are managed with adequate functional status:   Assess for contributors to thought disturbance, including medications, impaired vision or hearing, underlying metabolic abnormalities, dehydration, psychiatric diagnoses, notify LIP   [place] high risk fall precautions, as indicated   Provide frequent short contacts to provide reality reorientation, refocusing and direction   Decrease environmental stimuli, including noise as appropriate   Monitor and intervene to maintain adequate nutrition, hydration,

## 2024-11-01 NOTE — PROGRESS NOTES
Progress Note( Dr. Urias)  11/1/2024  Subjective:   Admit Date: 10/30/2024  PCP: Cheri Tapia APRN - CNP    Admitted For : Altered mental state with hyperglycemia and noncompliance with the DKA     Consulted For: Better control of blood glucose     Interval History: Patient feels better this morning.  The blood glucose is still higher    Denies any chest pains,   Denies SOB .   Denies nausea or vomiting.   No new bowel or bladder symptoms.       Intake/Output Summary (Last 24 hours) at 11/1/2024 0838  Last data filed at 10/31/2024 2024  Gross per 24 hour   Intake 972.65 ml   Output 200 ml   Net 772.65 ml       DATA    CBC:   Recent Labs     10/30/24  1704 11/01/24  0333   WBC 23.3* 15.8*   HGB 12.2* 11.2*    331    CMP:  Recent Labs     10/30/24  1704 10/31/24  0230 10/31/24  1010 11/01/24  0333   * 140 138 137   K 4.2 3.3* 3.3* 3.9   CL 96* 99 99 101   CO2 21 29 27 25   BUN 18 13 13 15   CREATININE 0.8 0.7 0.6 0.6   CALCIUM 9.4 9.3 9.0 8.7   BILITOT 0.3  --   --   --    ALKPHOS 100  --   --   --    AST 26  --   --   --    ALT 12  --   --   --      Lipids: No results found for: \"CHOL\", \"HDL\", \"TRIG\"  Glucose:  Recent Labs     10/31/24  1802 10/31/24  2024 11/01/24  0802   POCGLU 240* 258* 290*     [id number]:  Lab Results   Component Value Date/Time    LABA1C 9.5 10/30/2024 05:04 PM     High Sensitivity TSH: No results found for: \"TSHHS\"  Free T3: No results found for: \"FT3\"  Free T4:No results found for: \"T4FREE\"    XR KNEE RIGHT (3 VIEWS)   Final Result   No fracture or hardware complication. Chondrocalcinosis noted.         Electronically signed by Jerome Foster MD      CT ABDOMEN PELVIS WO CONTRAST Additional Contrast? None   Final Result      No acute abdominal or pelvic findings.            Electronically signed by [employer]      CT CERVICAL SPINE WO CONTRAST   Final Result      No acute intracranial findings.      No acute fracture or subluxation of the cervical spine.

## 2024-11-01 NOTE — DISCHARGE INSTRUCTIONS
Insulin regimen:  Continue to take Lantus 40 units at night.  Humalog:  Your basal pre-meal dose is 15 units, and you have to add more based on your glucose level as shown below.  Measure your blood glucose before each meal.  Adjust insulin dose per sliding scale:  Glucose: Dose:   15 units only (No additional humalog)  180-249 15 + 2 = take 17 Units  250-299 15 + 4 = take 19 Units  300-349 15 + 6 = take 21 Units  Over 349 15 + 8 = take 23 Units   Follow up with Endocrinology   Keep log of your blood glucose and take to your PCP and endocrinologist to make further adjustments as needed.

## 2024-11-01 NOTE — DISCHARGE SUMMARY
Discharge Summary    Name:  Shraddha Beebe /Age/Sex: 1975  (49 y.o. female)   MRN & CSN:  [id number] & [id number] Admission Date/Time: 10/30/2024  4:42 PM   Attending:  Cassie Colon MD Discharging Physician: Cassie Colon MD     Discharge diagnosis and plan:    DKA, type 1  Hx of IDDM  She was started on DKA protocol with fluids and insulin drip at admission, which has been appropriately stopped after her acidosis adequately resolved. She has been transitioned to SQ insulin regimen. Her home regimen includes Lantus 40 unit nightly and sliding scale only. She does not have basal pre-meal coverage. Endocrinology team onboard. Her daily requirement was calculated around 85-90 units for adequate control. She will be discharge on lantus 40 units + lispro 15 unit premeal + medium dose sliding scale for now. The regimen is current not balanced so will need close follow up with pcp and endocrinology to which the patient agrees.      Left first toe fracture at medial base on proximal phalanx, mildly displaced  Orthopedic team were consulted who suggested post-op shoe for now and to be followed up outpatient. She was advised to bear weight as tolerated.    Essential HTN, continue amlodipine, labetalol  Anxiety / depressive disorder, continue buspirone, duloxetine, sertraline  Insomnia, continue zolpidem   Restless leg syndrome, continue ropinirole  Mixed HLD, continue pravastatin       Discharge Exam  BP (!) 147/86   Pulse 86   Temp 98.5 °F (36.9 °C) (Oral)   Resp 18   Ht 1.575 m (5' 2\")   Wt 98 kg (216 lb 0.8 oz)   SpO2 91%   BMI 39.52 kg/m²     Physical Exam  Constitutional:       General: She is not in acute distress.     Appearance: She is well-developed.   HENT:      Head: Normocephalic and atraumatic.      Right Ear: External ear normal.      Left Ear: External ear normal.      Nose: Nose normal.   Eyes:      General: No scleral icterus.        Right eye: No discharge.         Left eye: No  recommendations, medications, and plan.     Consults this admission:  IP CONSULT TO CRITICAL CARE  IP CONSULT TO DIABETES EDUCATOR  IP CONSULT TO ENDOCRINOLOGY  IP CONSULT TO ORTHOPEDIC SURGERY      Discharge Instruction:   Handoff to PCP:   -  Follow up appointments: PCP  Primary care physician: -    Diet:  diabetic diet   Activity: activity as tolerated  Disposition: Discharged to:   [x]Home, [][facility], []SNF, []Acute Rehab, []Hospice   Condition on discharge: Stable    Discharge Medications:        Medication List        START taking these medications      insulin lispro (1 Unit Dial) 100 UNIT/ML Sopn  Commonly known as: HumaLOG KwikPen  Inject 15 Units into the skin 3 times daily (before meals) as long as you are eating/expected to eat > 50% of your meal and pre-meal blood glucose is > 100; add more humalog based on the sliding scale. If skipping meal, please take humalog only per sliding scale: Glucose:  - no additional units; 180-249 = +2 Units; 250-299 = +4 Units; 300-349 = +6 Units; Over 349 = +8 Units.     oxyCODONE-acetaminophen 5-325 MG per tablet  Commonly known as: PERCOCET  Take 1 tablet by mouth every 6 hours as needed for Pain for up to 3 days. Max Daily Amount: 4 tablets            CHANGE how you take these medications      metFORMIN 1000 MG tablet  Commonly known as: GLUCOPHAGE  Take 1 tablet by mouth 2 times daily (with meals)  What changed:   medication strength  how much to take            CONTINUE taking these medications      adalimumab 40 MG/0.8ML injection  Commonly known as: HUMIRA     amLODIPine 5 MG tablet  Commonly known as: NORVASC     baclofen 10 MG tablet  Commonly known as: LIORESAL     busPIRone 10 MG tablet  Commonly known as: BUSPAR     dicyclomine 10 MG capsule  Commonly known as: BENTYL     DULoxetine 60 MG extended release capsule  Commonly known as: CYMBALTA     famotidine 40 MG tablet  Commonly known as: PEPCID     Ferrous Sulfate Dried  (45 Fe) MG Tbcr     gabapentin

## 2024-11-01 NOTE — PROGRESS NOTES
Outpatient Pharmacy Progress Note for Meds-to-Beds    Total number of Prescriptions Filled: 6    Additional Documentation:  Medication(s) were delivered to the patient's room prior to discharge      Thank you for letting us serve your patients.  [address] [address]    Phone: [phone]    Fax: [phone]

## 2024-11-03 LAB
MICROORGANISM SPEC CULT: NORMAL
MICROORGANISM SPEC CULT: NORMAL
SERVICE CMNT-IMP: NORMAL
SERVICE CMNT-IMP: NORMAL
SPECIMEN DESCRIPTION: NORMAL
SPECIMEN DESCRIPTION: NORMAL

## 2024-11-07 ENCOUNTER — HOSPITAL ENCOUNTER (EMERGENCY)
Age: 49
Discharge: HOME | End: 2024-11-07
Payer: MEDICARE

## 2024-11-07 VITALS
OXYGEN SATURATION: 94 % | DIASTOLIC BLOOD PRESSURE: 111 MMHG | TEMPERATURE: 98.4 F | HEIGHT: 62 IN | BODY MASS INDEX: 39.56 KG/M2 | HEART RATE: 84 BPM | WEIGHT: 215 LBS | SYSTOLIC BLOOD PRESSURE: 141 MMHG | RESPIRATION RATE: 20 BRPM

## 2024-11-07 DIAGNOSIS — E83.42 HYPOMAGNESEMIA: Primary | ICD-10-CM

## 2024-11-07 LAB
ABSOLUTE BANDS: 0.33 K/UL
ALBUMIN SERPL-MCNC: 3.4 G/DL (ref 3.4–5)
ALBUMIN/GLOB SERPL: 0.8 {RATIO} (ref 1.1–2.2)
ALP SERPL-CCNC: 98 U/L (ref 40–129)
ALT SERPL-CCNC: 8 U/L (ref 10–40)
ANION GAP SERPL CALCULATED.3IONS-SCNC: 15 MMOL/L (ref 9–17)
AST SERPL-CCNC: 22 U/L (ref 15–37)
ATYPICAL LYMPHOCYTE ABSOLUTE COUNT: 0.17 K/UL
ATYPICAL LYMPHOCYTES: 1 %
BANDS: 2 %
BASOPHILS # BLD: 0 K/UL
BASOPHILS NFR BLD: 0 % (ref 0–1)
BILIRUB SERPL-MCNC: <0.2 MG/DL (ref 0–1)
BUN SERPL-MCNC: 9 MG/DL (ref 7–20)
CALCIUM SERPL-MCNC: 9.4 MG/DL (ref 8.3–10.6)
CHLORIDE SERPL-SCNC: 100 MMOL/L (ref 99–110)
CO2 SERPL-SCNC: 27 MMOL/L (ref 21–32)
CREAT SERPL-MCNC: 0.7 MG/DL (ref 0.6–1.1)
EOSINOPHIL # BLD: 0.17 K/UL
EOSINOPHILS RELATIVE PERCENT: 1 % (ref 0–3)
ERYTHROCYTE [DISTWIDTH] IN BLOOD BY AUTOMATED COUNT: 15.2 % (ref 11.7–14.9)
GFR, ESTIMATED: 85 ML/MIN/1.73M2
GLUCOSE SERPL-MCNC: 95 MG/DL (ref 74–99)
HCT VFR BLD AUTO: 36.6 % (ref 37–47)
HGB BLD-MCNC: 11.2 G/DL (ref 12.5–16)
LYMPHOCYTES NFR BLD: 7.8 K/UL
LYMPHOCYTES RELATIVE PERCENT: 47 % (ref 24–44)
MAGNESIUM SERPL-MCNC: 1.1 MG/DL (ref 1.8–2.4)
MCH RBC QN AUTO: 28.6 PG (ref 27–31)
MCHC RBC AUTO-ENTMCNC: 30.6 G/DL (ref 32–36)
MCV RBC AUTO: 93.4 FL (ref 78–100)
MONOCYTES NFR BLD: 1.16 K/UL
MONOCYTES NFR BLD: 7 % (ref 0–4)
NEUTROPHILS NFR BLD: 42 % (ref 36–66)
NEUTS SEG NFR BLD: 6.97 K/UL
PLATELET # BLD AUTO: 347 K/UL (ref 140–440)
PLATELET ESTIMATE: NORMAL
PMV BLD AUTO: 12.9 FL (ref 7.5–11.1)
POTASSIUM SERPL-SCNC: 4.1 MMOL/L (ref 3.5–5.1)
PROT SERPL-MCNC: 7.8 G/DL (ref 6.4–8.2)
RBC # BLD AUTO: 3.92 M/UL (ref 4.2–5.4)
RBC # BLD: ABNORMAL 10*6/UL
SODIUM SERPL-SCNC: 142 MMOL/L (ref 136–145)
WBC # BLD: NORMAL 10*3/UL
WBC OTHER # BLD: 16.6 K/UL (ref 4–10.5)

## 2024-11-07 PROCEDURE — 99284 EMERGENCY DEPT VISIT MOD MDM: CPT

## 2024-11-07 PROCEDURE — 96365 THER/PROPH/DIAG IV INF INIT: CPT

## 2024-11-07 PROCEDURE — 6370000000 HC RX 637 (ALT 250 FOR IP): Performed by: PHYSICIAN ASSISTANT

## 2024-11-07 PROCEDURE — 80053 COMPREHEN METABOLIC PANEL: CPT

## 2024-11-07 PROCEDURE — 83735 ASSAY OF MAGNESIUM: CPT

## 2024-11-07 PROCEDURE — 85025 COMPLETE CBC W/AUTO DIFF WBC: CPT

## 2024-11-07 PROCEDURE — 6360000002 HC RX W HCPCS: Performed by: PHYSICIAN ASSISTANT

## 2024-11-07 PROCEDURE — 93005 ELECTROCARDIOGRAM TRACING: CPT | Performed by: PHYSICIAN ASSISTANT

## 2024-11-07 RX ORDER — OXYCODONE AND ACETAMINOPHEN 5; 325 MG/1; MG/1
1 TABLET ORAL ONCE
Status: COMPLETED | OUTPATIENT
Start: 2024-11-07 | End: 2024-11-07

## 2024-11-07 RX ORDER — MAGNESIUM SULFATE IN WATER 40 MG/ML
2000 INJECTION, SOLUTION INTRAVENOUS ONCE
Status: COMPLETED | OUTPATIENT
Start: 2024-11-07 | End: 2024-11-07

## 2024-11-07 RX ADMIN — MAGNESIUM SULFATE HEPTAHYDRATE 2000 MG: 40 INJECTION, SOLUTION INTRAVENOUS at 19:05

## 2024-11-07 RX ADMIN — OXYCODONE HYDROCHLORIDE AND ACETAMINOPHEN 1 TABLET: 5; 325 TABLET ORAL at 19:19

## 2024-11-07 ASSESSMENT — PAIN SCALES - GENERAL
PAINLEVEL_OUTOF10: 10
PAINLEVEL_OUTOF10: 0
PAINLEVEL_OUTOF10: 8

## 2024-11-07 ASSESSMENT — PAIN - FUNCTIONAL ASSESSMENT
PAIN_FUNCTIONAL_ASSESSMENT: 0-10
PAIN_FUNCTIONAL_ASSESSMENT: NONE - DENIES PAIN
PAIN_FUNCTIONAL_ASSESSMENT: 0-10

## 2024-11-07 ASSESSMENT — PAIN DESCRIPTION - LOCATION: LOCATION: BUTTOCKS

## 2024-11-08 LAB — ADDITIONAL COMMENT:: NORMAL

## 2024-11-08 NOTE — ED PROVIDER NOTES
reduction techniques were used for this study:  All CT scans performed at this facility use one or all of the following: Automated exposure control, adjustment of the mA and/or kVp according to patient's size, iterative reconstruction. FINDINGS: HEAD:  The ventricles, cisterns, and sulci are age-appropriate. No evidence of acute infarction, intracranial hemorrhage, extra-axial fluid collection, or midline shift. The sinuses are clear. No depressed skull fracture. CERVICAL SPINE:  Very limited study due to motion artifact. Fixation hardware at the C5-C6 level. No acute cervical fracture is seen. No prevertebral soft tissue swelling. There is normal cervical alignment. The disc spaces and facet joints are preserved. The spinal canal is grossly patent.     No acute intracranial findings. No acute fracture or subluxation of the cervical spine. Electronically signed by Jos Charlton    CT CERVICAL SPINE WO CONTRAST    Result Date: 10/30/2024  CT of the Head and Cervical Spine HISTORY: HEAD TRAUMA, CLOSED, MILD, GCS >= 13, NO RISK FACTORS, NEURO EXAM NORMAL; ams/fall TECHNIQUE: CT scan of the brain and cervical spine without IV contrast. Coronal and sagittal reconstructions were obtained. Radiation dose reduction techniques were used for this study:  All CT scans performed at this facility use one or all of the following: Automated exposure control, adjustment of the mA and/or kVp according to patient's size, iterative reconstruction. FINDINGS: HEAD:  The ventricles, cisterns, and sulci are age-appropriate. No evidence of acute infarction, intracranial hemorrhage, extra-axial fluid collection, or midline shift. The sinuses are clear. No depressed skull fracture. CERVICAL SPINE:  Very limited study due to motion artifact. Fixation hardware at the C5-C6 level. No acute cervical fracture is seen. No prevertebral soft tissue swelling. There is normal cervical alignment. The disc spaces and facet joints are preserved. The spinal  Cheri PIMENTEL, APRN - CNP  [address] [facility] [id number] [phone]    In 2 days  for repeat blood testing    Disposition medications (if applicable):  New Prescriptions    No medications on file         Comment: Please note this report has been produced using speech recognition software and may contain errors related to that system including errors in grammar, punctuation, and spelling, as well as words and phrases that may be inappropriate. If there are any questions or concerns please feel free to contact the dictating provider for clarification.    Please note Images are personally interpreted by this Provider (KIMBERLY Strauss. )However final disposition is made with deference to Radiologist interpretation of said images.       \        Carlos Strauss PA-C  11/07/24 1601

## 2024-11-09 LAB
EKG ATRIAL RATE: 92 BPM
EKG DIAGNOSIS: NORMAL
EKG P AXIS: 80 DEGREES
EKG P-R INTERVAL: 154 MS
EKG Q-T INTERVAL: 348 MS
EKG QRS DURATION: 80 MS
EKG QTC CALCULATION (BAZETT): 430 MS
EKG R AXIS: 61 DEGREES
EKG T AXIS: 73 DEGREES
EKG VENTRICULAR RATE: 92 BPM

## 2024-11-09 PROCEDURE — 93010 ELECTROCARDIOGRAM REPORT: CPT | Performed by: INTERNAL MEDICINE
